# Patient Record
Sex: FEMALE | Race: BLACK OR AFRICAN AMERICAN | NOT HISPANIC OR LATINO | URBAN - METROPOLITAN AREA
[De-identification: names, ages, dates, MRNs, and addresses within clinical notes are randomized per-mention and may not be internally consistent; named-entity substitution may affect disease eponyms.]

---

## 2019-06-24 ENCOUNTER — EMERGENCY (EMERGENCY)
Facility: HOSPITAL | Age: 49
LOS: 1 days | Discharge: HOME | End: 2019-06-24
Attending: EMERGENCY MEDICINE | Admitting: EMERGENCY MEDICINE
Payer: COMMERCIAL

## 2019-06-24 VITALS
SYSTOLIC BLOOD PRESSURE: 147 MMHG | OXYGEN SATURATION: 99 % | TEMPERATURE: 98 F | WEIGHT: 154.98 LBS | RESPIRATION RATE: 16 BRPM | HEIGHT: 63 IN | HEART RATE: 74 BPM | DIASTOLIC BLOOD PRESSURE: 88 MMHG

## 2019-06-24 VITALS
OXYGEN SATURATION: 98 % | SYSTOLIC BLOOD PRESSURE: 126 MMHG | DIASTOLIC BLOOD PRESSURE: 74 MMHG | TEMPERATURE: 97 F | RESPIRATION RATE: 18 BRPM | HEART RATE: 72 BPM

## 2019-06-24 DIAGNOSIS — S31.801A LACERATION WITHOUT FOREIGN BODY OF UNSPECIFIED BUTTOCK, INITIAL ENCOUNTER: ICD-10-CM
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
## 2019-06-24 DIAGNOSIS — S09.90XA UNSPECIFIED INJURY OF HEAD, INITIAL ENCOUNTER: ICD-10-CM

## 2019-06-24 DIAGNOSIS — R42 DIZZINESS AND GIDDINESS: ICD-10-CM
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
## 2019-06-24 DIAGNOSIS — Z23 ENCOUNTER FOR IMMUNIZATION: ICD-10-CM

## 2019-06-24 PROCEDURE — 90715 TDAP VACCINE 7 YRS/> IM: CPT

## 2019-06-24 PROCEDURE — 99283 EMERGENCY DEPT VISIT LOW MDM: CPT | Mod: 25

## 2019-06-24 PROCEDURE — 90471 IMMUNIZATION ADMIN: CPT

## 2019-06-24 PROCEDURE — 99283 EMERGENCY DEPT VISIT LOW MDM: CPT

## 2019-06-24 PROCEDURE — 73502 X-RAY EXAM HIP UNI 2-3 VIEWS: CPT

## 2019-06-24 PROCEDURE — 73502 X-RAY EXAM HIP UNI 2-3 VIEWS: CPT | Mod: 26,LT

## 2019-06-24 RX ORDER — ACETAMINOPHEN 500 MG
1000 TABLET ORAL ONCE
Refills: 0 | Status: COMPLETED | OUTPATIENT
Start: 2019-06-24 | End: 2019-06-24

## 2019-06-24 RX ORDER — TETANUS TOXOID, REDUCED DIPHTHERIA TOXOID AND ACELLULAR PERTUSSIS VACCINE, ADSORBED 5; 2.5; 8; 8; 2.5 [IU]/.5ML; [IU]/.5ML; UG/.5ML; UG/.5ML; UG/.5ML
0.5 SUSPENSION INTRAMUSCULAR ONCE
Refills: 0 | Status: COMPLETED | OUTPATIENT
Start: 2019-06-24 | End: 2019-06-24

## 2019-06-24 RX ADMIN — Medication 1000 MILLIGRAM(S): at 14:33

## 2019-06-24 RX ADMIN — Medication 1000 MILLIGRAM(S): at 16:04

## 2019-06-24 RX ADMIN — TETANUS TOXOID, REDUCED DIPHTHERIA TOXOID AND ACELLULAR PERTUSSIS VACCINE, ADSORBED 0.5 MILLILITER(S): 5; 2.5; 8; 8; 2.5 SUSPENSION INTRAMUSCULAR at 16:02

## 2019-06-24 NOTE — ED ADULT NURSE NOTE - CHPI ED NUR SYMPTOMS NEG
no confusion/no weakness/no loss of consciousness/no numbness/no bleeding/no vomiting/no deformity/no tingling/no fever

## 2019-06-24 NOTE — ED PROVIDER NOTE - CLINICAL SUMMARY MEDICAL DECISION MAKING FREE TEXT BOX
mechanical fall last night w/ minor head inj. No signs of head trauma. No risk factors for inc bleeding. No neuro complaints or deficits. Pt does not meet criteria for head imaging. No midline pain. Analgesia. XR to r/o FB (glass). Wound care. Anticipate d/c

## 2019-06-24 NOTE — ED PROVIDER NOTE - NSFOLLOWUPINSTRUCTIONS_ED_ALL_ED_FT
You were evaluated in the ED after a fall last night with minor head injury. There was no visible trauma to your head. You may take Tylenol for pain. Your buttock wounds were cleansed and closed with surgical tape (steri-strips). Do not get this wet for 24 hours. The tape will fall off on its own. You received a tetanus vaccination today. It is good for the next 10 years.     Closed Head Injury    A closed head injury is an injury to your head that may or may not involve a traumatic brain injury (TBI). Symptoms of TBI can be short or long lasting and include headache, dizziness, interference with memory or speech, fatigue, confusion, changes in sleep, mood changes, nausea, depression/anxiety, and dulling of senses. Make sure to obtain proper rest which includes getting plenty of sleep, avoiding excessive visual stimulation, and avoiding activities that may cause physical or mental stress. Avoid any situation where there is potential for another head injury, including sports.    SEEK IMMEDIATE MEDICAL CARE IF YOU HAVE ANY OF THE FOLLOWING SYMPTOMS: unusual drowsiness, vomiting, severe dizziness, seizures, lightheadedness, muscular weakness, different pupil sizes, visual changes, or clear or bloody discharge from your ears or nose.     Post-Concussion Syndrome  Image   A concussion is a brain injury from a direct hit (blow) to your head or body. This blow causes your brain to shake quickly back and forth inside your skull. This can damage brain cells and cause chemical changes in your brain. Concussions are usually not life-threatening but can cause several serious symptoms.    Post-concussion syndrome is when symptoms that occur after a concussion last longer than normal. These symptoms can last from weeks to months.    What are the causes?  The cause of this condition is not known. It can happen whether your head injury was mild or severe.    What increases the risk?  You are more likely to develop this condition if:  You are female.  You are a child, teen, or young adult.  You had a past head injury.  You have a history of headaches.  You have depression or anxiety.  What are the signs or symptoms?  Physical symptoms     Headaches.  Tiredness.  Dizziness.  Weakness.  Blurry vision.  Sensitivity to light.  Hearing difficulties.  Mental and emotional symptoms     Memory difficulties.  Difficulty with concentration.  Difficulty sleeping or staying asleep.  Feeling irritable.  Anxiety or depression.  Difficulty learning new things.  How is this diagnosed?  This condition may be diagnosed based on:  Your symptoms.  A description of your injury.  Your medical history.  Your health care provider may order other tests such as:  Brain function tests (neurological testing).  CT scan.  How is this treated?  Treatment for this condition may depend on your symptoms. Symptoms usually go away on their own over time. Treatments may include:  Medicines for headaches.  Resting your brain and body for a few days after your injury.  Rehabilitation therapy, such as:  Physical or occupational therapy. This may include exercises to help with balance and dizziness.  Mental health counseling.  Speech therapy.  Vision therapy. A brain and eye specialist can recommend treatments for vision problems.  Follow these instructions at home:  Medicines     Take over-the-counter and prescription medicines only as told by your health care provider.  Avoid opioid prescription pain medicines when recovering from a concussion.  Activity     Limit your mental activities for the first few days after your injury, such as:  Homework or job-related work.  Complex thinking.  Watching TV, and using a computer or phone.  Playing memory games and puzzles.  Gradually return to your normal activity level. If a certain activity brings on your symptoms, stop or slow down until you can do the activity without it triggering your symptoms.  Limit physical activity, such as exercise or sports, for the first few days after a concussion. Gradually return to normal activity as told by your health care provider.  If a certain activity brings on your symptoms, stop or slow down until you can do the activity without it triggering your symptoms.  Rest. Rest helps your brain heal. Make sure you:  Get plenty of sleep at night. Most adults should get at least 7–9 hours of sleep each night.  Rest during the day. Take naps or rest breaks when you feel tired.  Do not do high-risk activities that could cause a second concussion, such as riding a bike or playing sports. Having another concussion before the first one has healed can be dangerous.  General instructions     Do not drink alcohol until your health care provider says you can.  Keep track of the frequency and the severity of your symptoms. Give this information to your health care provider.  Keep all follow-up visits as directed by your health care provider. This is important.  Contact a health care provider if:  Your symptoms do not improve.  You have another injury.  Get help right away if you:  Have a severe or worsening headache.  Are confused.  Have trouble staying awake.  Pass out.  Vomit.  Have weakness or numbness in any part of your body.  Have a seizure.  Have trouble speaking.  Summary  Post-concussion syndrome is when symptoms that occur after a concussion last longer than normal.  Symptoms usually go away on their own over time. Depending on your symptoms, you may need treatment, such as medicines or rehabilitation therapy.  Rest your brain and body for a few days after your injury. Gradually return to activities, as told by your health care provider.  Get plenty of sleep, and avoid alcohol and opioid pain medicines while recovering from a concussion.  This information is not intended to replace advice given to you by your health care provider. Make sure you discuss any questions you have with your health care provider.    Steristrips    WHAT YOU NEED TO KNOW:    Steristrips are sterile pieces of medical tape used to close wounds and help the edges grow back together. Steristrips keep the wound clean and protected while it heals. Steristrips usually fall off on their own in about 7 to 10 days.    DISCHARGE INSTRUCTIONS:    Return to the emergency department if:     You have a fever.       You see red streaks on your skin starting at the wound.       Your wound has a foul smell or is draining fluid or pus.       Your wound is red, swollen, and warm to the touch.       Your wound opens or comes apart.     Contact your healthcare provider if:     The skin under and around the steristrips itches or is not comfortable.      You have questions or concerns about your condition or care.     How to use steristrips: Always wash your hands before you care for your wound. This will help prevent infection. Clean and dry the skin around your wound before you put on new steristrips.    Care for the wound as directed. Do not bathe for 24 hours. After 24 hours, wash around the area gently as directed. Pat the area dry with a clean towel, or let it air dry. Do not rub the area with a towel to dry it. Check the wound for signs of infection, such as swelling or pus.       Only remove the steristrips if directed. Your healthcare provider may want you to remove the steristrips after a certain number of days. Do not remove them early, even if they are causing itching or discomfort. If you are directed to remove the steristrips, pull gently. You might cause the wound to open if you pull hard. Hold the skin down as you slowly and gently remove the strips.      Apply new steristrips as directed. Start at the middle of the wound. Gently bring the edges of the wound together and place the middle of the steristrip on the wound. Do not stretch the steristrip. Smooth the ends of the steristrip down onto your skin. Add more steristrips as needed for the rest of the wound. Leave small gaps between strips so you do not completely cover the wound. Fluid from the wound may build under the strips if you completely cover it. The fluid may make the strips peel. Your healthcare provider may recommend that you add steristrips down the ends of the pieces you placed across the wound. This will help keep the steristrips in place as you move.      Do not scrub or pick at the steristrips. This can cause your wound to open. Trim the edges of the strips if they start to curl. Then press the ends firmly onto your skin.    Follow up with your healthcare provider as directed: Write down your questions so you remember to ask them during your visits.

## 2019-06-24 NOTE — ED PROVIDER NOTE - OBJECTIVE STATEMENT
Pt w/ no sig PMHx presents s/p fall last night. Pt slipped on oil on the floor and fell backwards onto broken glass on the ground w/ her buttock, and hitting her head. No LOC. Pt felt initially dizzy. Pt reports cuts to her buttock. Pt c/o pain at site of hitting her head in the occipital region, pain in b/l trapezius mm and shoulders. No midline pain. no numbness/ tingling. No focal weakness. No n/v, confusion. No hx alcohol intake or drug abuse. No AC use. Pt has not taken anything for pain

## 2019-06-24 NOTE — ED ADULT NURSE NOTE - OTHER COMPLAINTS
Pt states she also has cuts to her buttock because the oil came from a glass olive oil bottle that fell". pt denies LOC and blood thinners.

## 2019-06-24 NOTE — ED PROVIDER NOTE - PHYSICAL EXAMINATION
Constitutional: Well appearing, well nourished, awake, alert, oriented to person, place, time/situation and in no apparent distress.  head atraumatic, normocephalic. no abrasions, lacerations, or hematomas  ENMT: Airway patent. Normal MM. no romero signs or raccoon's eyes. no hemotympanum  Eyes: Clear bilaterally. PERRL. EOMI. No nystagmus  Cardiac: Normal rate, regular rhythm.  Heart sounds S1, S2.  No murmurs, rubs or gallops.  Respiratory: Breaths sounds equal and clear b/l. No increased WOB, tachypnea, hypoxia, or accessory mm use. Pt speaks in full sentences.   Gastrointestinal: Abd soft, NT, ND, NABS. No guarding, rebound, or rigidity. No pulsatile abdominal masses. No organomegaly appreciated. No CVAT   Musculoskeletal: Range of motion is not limited. No midline ttp. + paraspinal L cervical and b/l thoracic mm ttp  Neuro: Alert & Oriented x 3. CN II-XII intact. No facial droop. Clear speech. BISHOP w/ 5/5 strength x 4 ext. Normal sensation. No pronator drift. No dysdidokinesia nor dysmetria. Normal heel-to-shin.   Skin: Skin warm, dry. No rash. 4-5 superficial lacerations to buttock. No obvious foreign body  Psych: Alert and oriented to person, place, time/situation. normal mood and affect. no apparent risk to self or others. Constitutional: Well appearing, well nourished, awake, alert, oriented to person, place, time/situation and in no apparent distress.  head atraumatic, normocephalic. no abrasions, lacerations, or hematomas  ENMT: Airway patent. Normal MM. no romero signs or raccoon's eyes. no hemotympanum  Eyes: Clear bilaterally. PERRL. EOMI. No nystagmus  Cardiac: Normal rate, regular rhythm.  Heart sounds S1, S2.  No murmurs, rubs or gallops.  Respiratory: Breaths sounds equal and clear b/l. No increased WOB, tachypnea, hypoxia, or accessory mm use. Pt speaks in full sentences.   Gastrointestinal: Abd soft, NT, ND, NABS. No guarding, rebound, or rigidity. No pulsatile abdominal masses. No organomegaly appreciated. No CVAT   Musculoskeletal: Range of motion is not limited. No midline ttp. + paraspinal L cervical and b/l thoracic mm ttp  Neuro: Alert & Oriented x 3. CN II-XII intact. No facial droop. Clear speech. BISHOP w/ 5/5 strength x 4 ext. Normal sensation. No pronator drift. No dysdidokinesia nor dysmetria. Normal heel-to-shin.   Skin: Skin warm, dry. No rash. 7 superficial lacerations involving only superficial dermis layer, each < 1 cm to buttock. No foreign body on probing  Psych: Alert and oriented to person, place, time/situation. normal mood and affect. no apparent risk to self or others.

## 2019-06-24 NOTE — ED ADULT TRIAGE NOTE - OTHER COMPLAINTS
Pt states she also has cuts to her buttock because the oil came from a glass olive oil bottle that fell". Pt states she also has cuts to her buttock because the oil came from a glass olive oil bottle that fell". pt denies LOC and blood thinners.

## 2019-06-24 NOTE — ED ADULT NURSE NOTE - OBJECTIVE STATEMENT
Patient states was at home when at 11 pm last night  slipped on oil on floor, hit back of head, back and buttocks, states there was broken glass on floor and also hurt left hand, abrasions on hand and buttocks. States no LOC , felt dizzy and went to [facility] but left before being seen due to long wait time.  States has nausea, no vomitting, w/ mild dizziness, c/o of pain on back of head, lower back and shoulders.

## 2019-06-24 NOTE — ED ADULT NURSE REASSESSMENT NOTE - NS ED NURSE REASSESS COMMENT FT1
Patient headache and body aches improved s/p Acetaminophen PO, wounds cleansed and dressing applied.  All tests resulted, discharged to home in stable condition, VSS.

## 2019-06-24 NOTE — ED ADULT NURSE NOTE - NSIMPLEMENTINTERV_GEN_ALL_ED
Implemented All Universal Safety Interventions:  [place] to call system. Call bell, personal items and telephone within reach. Instruct patient to call for assistance. Room bathroom lighting operational. Non-slip footwear when patient is off stretcher. Physically safe environment: no spills, clutter or unnecessary equipment. Stretcher in lowest position, wheels locked, appropriate side rails in place.

## 2019-06-24 NOTE — ED PROVIDER NOTE - NS ED ROS FT
Constitutional: No fever or chills.   Eyes: No pain, blurry vision, or discharge.  ENMT: No hearing changes, pain, discharge or infections. No neck pain or stiffness.  Cardiac: No chest pain, SOB or edema. No chest pain with exertion.  Respiratory: No cough or respiratory distress. No hemoptysis. No history of asthma or RAD.  GI: No nausea, vomiting, diarrhea or abdominal pain.  : No dysuria, frequency or burning.  MS: No myalgia, muscle weakness, joint pain or back pain.  Neuro: No headache or weakness. No LOC.  Skin: See HPI  Endocrine: No history of thyroid disease or diabetes.  Except as documented in the HPI, all other systems are negative.

## 2019-06-24 NOTE — ED PROVIDER NOTE - DIAGNOSTIC INTERPRETATION
INTERPRETATION:  no acute fracture; no soft tissue swelling noted; normal bony alignment. No FB seen.

## 2019-09-01 ENCOUNTER — EMERGENCY (EMERGENCY)
Facility: HOSPITAL | Age: 49
LOS: 1 days | Discharge: HOME | End: 2019-09-01
Admitting: EMERGENCY MEDICINE
Payer: COMMERCIAL

## 2019-09-01 VITALS
DIASTOLIC BLOOD PRESSURE: 63 MMHG | SYSTOLIC BLOOD PRESSURE: 120 MMHG | RESPIRATION RATE: 18 BRPM | TEMPERATURE: 98 F | OXYGEN SATURATION: 99 % | HEART RATE: 79 BPM | WEIGHT: 164.46 LBS

## 2019-09-01 PROCEDURE — 99283 EMERGENCY DEPT VISIT LOW MDM: CPT

## 2019-09-01 PROCEDURE — 73562 X-RAY EXAM OF KNEE 3: CPT | Mod: 26,LT

## 2019-09-01 PROCEDURE — 73562 X-RAY EXAM OF KNEE 3: CPT

## 2019-09-01 RX ADMIN — Medication 500 MILLIGRAM(S): at 18:16

## 2019-09-01 NOTE — ED PROVIDER NOTE - PATIENT PORTAL LINK FT
You can access the FollowMyHealth Patient Portal offered by [facility] by registering at the following website: [email or web address]. By joining [employer]’s FollowMyHealth portal, you will also be able to view your health information using other applications (apps) compatible with our system.

## 2019-09-01 NOTE — ED PROVIDER NOTE - CARE PROVIDER_API CALL
Kathia Montes De Oca)  Orthopaedic Surgery Surgery  [address] [place] [id number]  Phone: [phone]  Fax: [phone]  Follow Up Time:     Karan Jung)  Orthopaedic Surgery  [address] [address]  Phone: [phone]  Fax: [phone]  Follow Up Time:

## 2019-09-01 NOTE — ED PROVIDER NOTE - CLINICAL SUMMARY MEDICAL DECISION MAKING FREE TEXT BOX
47 y/o f presents c/o left knee pain x 1 week; ext NVI, xray negative, ace wrap given, will rx naproxen, recommend RICE, f/u ortho

## 2019-09-01 NOTE — ED PROVIDER NOTE - OBJECTIVE STATEMENT
49 y/o f presents c/o 1 week of left knee pain.  Pt stating "I feel a click", she also reports hitting her knee over the past week although pain had already been going on.  Pt stating she took naproxen yesterday with improvement.  Pt was seen at another ED in the past 3 months for same complaint, had negative ultrasound per pt.  Denies numbness/tingling to ext, weakness, all other ROS negative.

## 2019-09-01 NOTE — ED ADULT NURSE NOTE - NSIMPLEMENTINTERV_GEN_ALL_ED
Implemented All Universal Safety Interventions:  [place] to call system. Call bell, personal items and telephone within reach. Instruct patient to call for assistance. Room bathroom lighting operational. Non-slip footwear when patient is off stretcher. Physically safe environment: no spills, clutter or unnecessary equipment. Stretcher in lowest position, wheels locked, appropriate side rails in place.

## 2019-09-01 NOTE — ED PROVIDER NOTE - MUSCULOSKELETAL, MLM
+generalized tenderness to left knee, no swelling, no deformity, +FROM, strength 5/5, sensation intact distally

## 2019-09-05 DIAGNOSIS — M25.562 PAIN IN LEFT KNEE: ICD-10-CM

## 2019-09-16 ENCOUNTER — FORM ENCOUNTER (OUTPATIENT)
Age: 49
End: 2019-09-16

## 2019-09-17 ENCOUNTER — APPOINTMENT (OUTPATIENT)
Dept: ORTHOPEDIC SURGERY | Facility: CLINIC | Age: 49
End: 2019-09-17
Payer: COMMERCIAL

## 2019-09-17 ENCOUNTER — OUTPATIENT (OUTPATIENT)
Dept: OUTPATIENT SERVICES | Facility: HOSPITAL | Age: 49
LOS: 1 days | End: 2019-09-17
Payer: COMMERCIAL

## 2019-09-17 VITALS — BODY MASS INDEX: 29.06 KG/M2 | WEIGHT: 164 LBS | HEIGHT: 63 IN

## 2019-09-17 PROCEDURE — 72170 X-RAY EXAM OF PELVIS: CPT | Mod: 26

## 2019-09-17 PROCEDURE — 73564 X-RAY EXAM KNEE 4 OR MORE: CPT | Mod: 26,50

## 2019-09-17 PROCEDURE — 99203 OFFICE O/P NEW LOW 30 MIN: CPT

## 2019-09-17 PROCEDURE — 73564 X-RAY EXAM KNEE 4 OR MORE: CPT

## 2019-09-17 PROCEDURE — 72170 X-RAY EXAM OF PELVIS: CPT

## 2019-09-17 NOTE — PHYSICAL EXAM
[de-identified] : Constitutional: Well appearing.  No acute distress.\par Mental Status: Alert & oriented to person, place and time. Normal affect.\par Pulmonary: No respiratory distress. Normal chest excursion.\par Abdomen: Soft and not distended.\par \par Gait: Severe Antalgic.\par Ambulatory assist devices: Cane.\par Cervical spine: Skin intact. No visible deformity.  Painless active ROM without evident restriction.\par Bilateral upper extremities: Skin intact. No deformity. Painless active ROM without evident restriction.\par Thoracolumbar spine: No deformity. No tenderness. No radicular pain on passive straight leg raise bilaterally.\par Pelvis: No pelvic obliquity. No tenderness.\par Leg lengths: Equal.\par \par Bilateral Hips: No swelling or deformity. No focal tenderness. Painless and unrestricted range of motion.  No crepitation. Hip flexor and abductor power 5/5.\par \par Right Knee:\par Skin intact.\par No surgical scars.\par No erythema or ecchymosis.\par No swelling or effusion.\par No deformity.\par Minimal medial joint line tenderness.\par Mildly painful ROM from full extension to 120 degrees of flexion.\par Central patellar tracking.\par No crepitation.\par No instability.\par Quadriceps power 5/5.\par \par Left Knee:\par Skin intact.\par No surgical scars.\par No erythema or ecchymosis.\par Trace effusion.\par No deformity.\par Medial joint line tenderness.\par Painful ROM from full extension to 100 degrees of flexion, limited due to patient guarding.\par Central patellar tracking.\par No crepitation.\par No instability.\par Quadriceps power 5/5.\par \par Neurological: Intact distal crude touch sensation. Normal distal motor power. Symmetric knee jerk and ankle jerk reflexes bilaterally.\par Cardiovascular: Palpable dorsalis pedis and posterior tibialis pulses. Brisk capillary refill. No peripheral edema.\par Lymphatics:  No peripheral adenopathy appreciated.\par \par  [de-identified] : X-rays taken today demonstrate left knee with minimal degenerative changes with out acute fracture or dislocation, right knee with mild medial joint space narrowing

## 2019-09-17 NOTE — REASON FOR VISIT
[Initial Visit] : an initial visit for [Knee Pain] : knee pain [FreeTextEntry2] : bilateral knee left > right

## 2019-09-17 NOTE — HISTORY OF PRESENT ILLNESS
[de-identified] : Patient presents for evaluation of left knee pain which began about one and a half months ago. She notes pain began after fall down two steps and jumping onto knee. She has severe pain when weightbearing and notes buckling and instability. She has tried PT without relief. She is ambulating with a cane. She is taking Naproxen without relief.

## 2019-09-17 NOTE — DISCUSSION/SUMMARY
[de-identified] : Diagnosis, prognosis and treatment options discussed. Conservative management including activity modification, therapeutic exercise with PT, topical and oral antiinflammatories, steroid and HA injections discussed. Given patient's severe pain will order MRI. Patient advised to follow up after imaging.

## 2019-10-01 ENCOUNTER — FORM ENCOUNTER (OUTPATIENT)
Age: 49
End: 2019-10-01

## 2019-10-02 ENCOUNTER — APPOINTMENT (OUTPATIENT)
Dept: MRI IMAGING | Facility: HOSPITAL | Age: 49
End: 2019-10-02
Payer: COMMERCIAL

## 2019-10-02 ENCOUNTER — OUTPATIENT (OUTPATIENT)
Dept: OUTPATIENT SERVICES | Facility: HOSPITAL | Age: 49
LOS: 1 days | End: 2019-10-02
Payer: COMMERCIAL

## 2019-10-02 PROCEDURE — 73721 MRI JNT OF LWR EXTRE W/O DYE: CPT | Mod: 26,LT

## 2019-10-02 PROCEDURE — 73721 MRI JNT OF LWR EXTRE W/O DYE: CPT

## 2019-10-08 ENCOUNTER — APPOINTMENT (OUTPATIENT)
Dept: ORTHOPEDIC SURGERY | Facility: CLINIC | Age: 49
End: 2019-10-08
Payer: COMMERCIAL

## 2019-10-08 VITALS — BODY MASS INDEX: 29.06 KG/M2 | HEIGHT: 63 IN | WEIGHT: 164 LBS

## 2019-10-08 PROCEDURE — 99213 OFFICE O/P EST LOW 20 MIN: CPT

## 2019-10-08 NOTE — DISCUSSION/SUMMARY
[de-identified] : Diagnosis, prognosis and treatment options discussed. Conservative management including activity modification, therapeutic exercise with PT, topical and oral antiinflammatories, steroid and HA injections discussed. Patient will continue PT and NSAID prn. Given names of arthroscopy specialist if pain persists.

## 2019-10-08 NOTE — ADDENDUM
[FreeTextEntry1] : Dr. Rosado was physically present during the key portions the encounter, provided assistance as needed, and formulated the assessment and plan as documented above.\par \par

## 2019-10-08 NOTE — HISTORY OF PRESENT ILLNESS
[de-identified] : Patient presents for follow up of left knee pain and MRI review. She reports no improvement in symptoms. She has been doing PT and taking Naproxen without relief.

## 2019-10-08 NOTE — PHYSICAL EXAM
[de-identified] : Constitutional: Well appearing. No acute distress.\par Gait: Severe Antalgic.\par Ambulatory assist devices: Cane.\par \par Left Knee:\par Skin intact.\par No surgical scars.\par No erythema or ecchymosis.\par Trace effusion.\par No deformity.\par Medial joint line tenderness.\par Painful ROM from full extension to 100 degrees of flexion, limited due to patient guarding.\par Central patellar tracking.\par No crepitation.\par No instability.\par Quadriceps power 5/5.\par \par Neurological: Intact distal crude touch sensation. Normal distal motor power. Symmetric knee jerk and ankle jerk reflexes bilaterally.\par Cardiovascular: Palpable dorsalis pedis and posterior tibialis pulses. Brisk capillary refill. No peripheral edema.\par Lymphatics: No peripheral adenopathy appreciated.\par  [de-identified] : MRI reviewed, detailed report scanned into chart

## 2019-10-23 ENCOUNTER — APPOINTMENT (OUTPATIENT)
Dept: ORTHOPEDIC SURGERY | Facility: CLINIC | Age: 49
End: 2019-10-23
Payer: COMMERCIAL

## 2019-10-23 VITALS — DIASTOLIC BLOOD PRESSURE: 81 MMHG | OXYGEN SATURATION: 99 % | HEART RATE: 70 BPM | SYSTOLIC BLOOD PRESSURE: 123 MMHG

## 2019-10-23 VITALS — RESPIRATION RATE: 16 BRPM | WEIGHT: 164 LBS | BODY MASS INDEX: 29.06 KG/M2 | HEIGHT: 63 IN

## 2019-10-23 DIAGNOSIS — Z78.9 OTHER SPECIFIED HEALTH STATUS: ICD-10-CM

## 2019-10-23 PROCEDURE — 99214 OFFICE O/P EST MOD 30 MIN: CPT

## 2019-11-21 ENCOUNTER — RX CHANGE (OUTPATIENT)
Age: 49
End: 2019-11-21

## 2019-11-21 ENCOUNTER — APPOINTMENT (OUTPATIENT)
Age: 49
End: 2019-11-21
Payer: COMMERCIAL

## 2019-11-21 PROCEDURE — 29882 ARTHRS KNE SRG MNISC RPR M/L: CPT | Mod: LT

## 2019-11-21 PROCEDURE — 29877 ARTHRS KNEE SURG DBRDMT/SHVG: CPT | Mod: NC,LT

## 2019-11-21 RX ORDER — ASPIRIN 325 MG/1
325 TABLET, FILM COATED ORAL DAILY
Qty: 15 | Refills: 0 | Status: ACTIVE | COMMUNITY
Start: 2019-11-21 | End: 1900-01-01

## 2019-11-21 RX ORDER — DOCUSATE SODIUM 100 MG
100 TABLET ORAL
Qty: 30 | Refills: 0 | Status: ACTIVE | COMMUNITY
Start: 2019-11-21 | End: 1900-01-01

## 2019-12-04 ENCOUNTER — APPOINTMENT (OUTPATIENT)
Dept: ORTHOPEDIC SURGERY | Facility: CLINIC | Age: 49
End: 2019-12-04
Payer: COMMERCIAL

## 2019-12-04 VITALS — BODY MASS INDEX: 29.06 KG/M2 | RESPIRATION RATE: 16 BRPM | HEIGHT: 63 IN | WEIGHT: 164 LBS

## 2019-12-04 DIAGNOSIS — S83.272A COMPLEX TEAR OF LATERAL MENISCUS, CURRENT INJURY, LEFT KNEE, INITIAL ENCOUNTER: ICD-10-CM

## 2019-12-04 DIAGNOSIS — S83.249A OTHER TEAR OF MEDIAL MENISCUS, CURRENT INJURY, UNSPECIFIED KNEE, INITIAL ENCOUNTER: ICD-10-CM

## 2019-12-04 DIAGNOSIS — M25.562 PAIN IN LEFT KNEE: ICD-10-CM

## 2019-12-04 PROCEDURE — 99024 POSTOP FOLLOW-UP VISIT: CPT

## 2020-01-06 ENCOUNTER — APPOINTMENT (OUTPATIENT)
Dept: ORTHOPEDIC SURGERY | Facility: CLINIC | Age: 50
End: 2020-01-06
Payer: COMMERCIAL

## 2020-01-06 PROCEDURE — 99024 POSTOP FOLLOW-UP VISIT: CPT

## 2020-02-19 ENCOUNTER — APPOINTMENT (OUTPATIENT)
Dept: ORTHOPEDIC SURGERY | Facility: CLINIC | Age: 50
End: 2020-02-19
Payer: COMMERCIAL

## 2020-02-19 VITALS — RESPIRATION RATE: 16 BRPM | BODY MASS INDEX: 29.06 KG/M2 | WEIGHT: 164 LBS | HEIGHT: 63 IN

## 2020-02-19 DIAGNOSIS — S83.242A OTHER TEAR OF MEDIAL MENISCUS, CURRENT INJURY, LEFT KNEE, INITIAL ENCOUNTER: ICD-10-CM

## 2020-02-19 PROCEDURE — 99024 POSTOP FOLLOW-UP VISIT: CPT

## 2020-02-24 ENCOUNTER — EMERGENCY (EMERGENCY)
Facility: HOSPITAL | Age: 50
LOS: 1 days | Discharge: HOME | End: 2020-02-24
Attending: EMERGENCY MEDICINE | Admitting: EMERGENCY MEDICINE
Payer: COMMERCIAL

## 2020-02-24 VITALS
HEART RATE: 91 BPM | HEIGHT: 63 IN | TEMPERATURE: 99 F | DIASTOLIC BLOOD PRESSURE: 83 MMHG | WEIGHT: 179.02 LBS | OXYGEN SATURATION: 100 % | SYSTOLIC BLOOD PRESSURE: 148 MMHG | RESPIRATION RATE: 18 BRPM

## 2020-02-24 VITALS
HEART RATE: 88 BPM | TEMPERATURE: 99 F | SYSTOLIC BLOOD PRESSURE: 139 MMHG | RESPIRATION RATE: 18 BRPM | OXYGEN SATURATION: 99 % | DIASTOLIC BLOOD PRESSURE: 82 MMHG

## 2020-02-24 LAB
ALBUMIN SERPL ELPH-MCNC: 3.8 G/DL — SIGNIFICANT CHANGE UP (ref 3.3–5)
ALP SERPL-CCNC: SIGNIFICANT CHANGE UP (ref 40–120)
ALT FLD-CCNC: SIGNIFICANT CHANGE UP (ref 10–45)
ANION GAP SERPL CALC-SCNC: 10 MMOL/L — SIGNIFICANT CHANGE UP (ref 5–17)
AST SERPL-CCNC: SIGNIFICANT CHANGE UP (ref 10–40)
BASOPHILS # BLD AUTO: 0.03 K/UL — SIGNIFICANT CHANGE UP (ref 0–0.2)
BASOPHILS NFR BLD AUTO: 0.5 % — SIGNIFICANT CHANGE UP (ref 0–2)
BILIRUB SERPL-MCNC: 0.4 MG/DL — SIGNIFICANT CHANGE UP (ref 0.2–1.2)
BUN SERPL-MCNC: 8 MG/DL — SIGNIFICANT CHANGE UP (ref 7–23)
CALCIUM SERPL-MCNC: 8.9 MG/DL — SIGNIFICANT CHANGE UP (ref 8.4–10.5)
CHLORIDE SERPL-SCNC: 102 MMOL/L — SIGNIFICANT CHANGE UP (ref 96–108)
CO2 SERPL-SCNC: 24 MMOL/L — SIGNIFICANT CHANGE UP (ref 22–31)
CREAT SERPL-MCNC: 0.7 MG/DL — SIGNIFICANT CHANGE UP (ref 0.5–1.3)
D DIMER BLD IA.RAPID-MCNC: 346 NG/ML DDU — HIGH
EOSINOPHIL # BLD AUTO: 0.11 K/UL — SIGNIFICANT CHANGE UP (ref 0–0.5)
EOSINOPHIL NFR BLD AUTO: 1.8 % — SIGNIFICANT CHANGE UP (ref 0–6)
GLUCOSE SERPL-MCNC: 93 MG/DL — SIGNIFICANT CHANGE UP (ref 70–99)
HCT VFR BLD CALC: 37 % — SIGNIFICANT CHANGE UP (ref 34.5–45)
HGB BLD-MCNC: 11.7 G/DL — SIGNIFICANT CHANGE UP (ref 11.5–15.5)
IMM GRANULOCYTES NFR BLD AUTO: 0.2 % — SIGNIFICANT CHANGE UP (ref 0–1.5)
LYMPHOCYTES # BLD AUTO: 1.78 K/UL — SIGNIFICANT CHANGE UP (ref 1–3.3)
LYMPHOCYTES # BLD AUTO: 28.6 % — SIGNIFICANT CHANGE UP (ref 13–44)
MCHC RBC-ENTMCNC: 29.3 PG — SIGNIFICANT CHANGE UP (ref 27–34)
MCHC RBC-ENTMCNC: 31.6 GM/DL — LOW (ref 32–36)
MCV RBC AUTO: 92.5 FL — SIGNIFICANT CHANGE UP (ref 80–100)
MONOCYTES # BLD AUTO: 0.51 K/UL — SIGNIFICANT CHANGE UP (ref 0–0.9)
MONOCYTES NFR BLD AUTO: 8.2 % — SIGNIFICANT CHANGE UP (ref 2–14)
NEUTROPHILS # BLD AUTO: 3.79 K/UL — SIGNIFICANT CHANGE UP (ref 1.8–7.4)
NEUTROPHILS NFR BLD AUTO: 60.7 % — SIGNIFICANT CHANGE UP (ref 43–77)
NRBC # BLD: 0 /100 WBCS — SIGNIFICANT CHANGE UP (ref 0–0)
PLATELET # BLD AUTO: 319 K/UL — SIGNIFICANT CHANGE UP (ref 150–400)
POTASSIUM SERPL-MCNC: SIGNIFICANT CHANGE UP (ref 3.5–5.3)
POTASSIUM SERPL-SCNC: SIGNIFICANT CHANGE UP (ref 3.5–5.3)
PROT SERPL-MCNC: 7.4 G/DL — SIGNIFICANT CHANGE UP (ref 6–8.3)
RBC # BLD: 4 M/UL — SIGNIFICANT CHANGE UP (ref 3.8–5.2)
RBC # FLD: 13.3 % — SIGNIFICANT CHANGE UP (ref 10.3–14.5)
SODIUM SERPL-SCNC: 136 MMOL/L — SIGNIFICANT CHANGE UP (ref 135–145)
WBC # BLD: 6.23 K/UL — SIGNIFICANT CHANGE UP (ref 3.8–10.5)
WBC # FLD AUTO: 6.23 K/UL — SIGNIFICANT CHANGE UP (ref 3.8–10.5)

## 2020-02-24 PROCEDURE — 80053 COMPREHEN METABOLIC PANEL: CPT

## 2020-02-24 PROCEDURE — 71275 CT ANGIOGRAPHY CHEST: CPT | Mod: 26

## 2020-02-24 PROCEDURE — 71275 CT ANGIOGRAPHY CHEST: CPT

## 2020-02-24 PROCEDURE — 71046 X-RAY EXAM CHEST 2 VIEWS: CPT | Mod: 26

## 2020-02-24 PROCEDURE — 85025 COMPLETE CBC W/AUTO DIFF WBC: CPT

## 2020-02-24 PROCEDURE — 99284 EMERGENCY DEPT VISIT MOD MDM: CPT | Mod: 25

## 2020-02-24 PROCEDURE — 85379 FIBRIN DEGRADATION QUANT: CPT

## 2020-02-24 PROCEDURE — 99285 EMERGENCY DEPT VISIT HI MDM: CPT | Mod: 25

## 2020-02-24 PROCEDURE — 36415 COLL VENOUS BLD VENIPUNCTURE: CPT

## 2020-02-24 PROCEDURE — 71046 X-RAY EXAM CHEST 2 VIEWS: CPT

## 2020-02-24 RX ORDER — FLUCONAZOLE 150 MG/1
150 TABLET ORAL ONCE
Refills: 0 | Status: COMPLETED | OUTPATIENT
Start: 2020-02-24 | End: 2020-02-24

## 2020-02-24 RX ADMIN — FLUCONAZOLE 150 MILLIGRAM(S): 150 TABLET ORAL at 17:55

## 2020-02-24 NOTE — ED PROVIDER NOTE - CLINICAL SUMMARY MEDICAL DECISION MAKING FREE TEXT BOX
49 healthy F p/w 2 weeks intermittent chest discomfort; recent sx end of november, EKG nonischemic, CXR no infiltrate/effusion, ddimer 346 will obtain CTA chest.  also w/ 2 days vaginal discharge- candida on exam, given diflucan PO in ED, no STI risk

## 2020-02-24 NOTE — ED PROVIDER NOTE - OBJECTIVE STATEMENT
49 healthy F p/w 2 weeks intermittent chest discomfort and 2 days vaginal discharge.  Pt reports vague chest discomfort described as congestion- no associated sob, with episodes of sharp nonpleuritic pain.  Pt nonsmoker, no h/o asthma but states she feels like she wheezes when she experiences symptoms.  Had L knee arthroscopy end of November, denies leg pain/swelling.  Pt also c/o pruritic white vaginal d/c x 2 days, no urinary symptoms.  Pt has not been sexually active since  passed one year ago.  Denies fever, chills, headache, dizziness, cough, abd pain, nvd, travel, OCP use, trauma 49 healthy F p/w 2 weeks intermittent chest discomfort and 2 days vaginal discharge.  Pt reports vague chest discomfort described as congestion- no associated sob, with episodes of sharp nonpleuritic pain.  Pt nonsmoker, no h/o asthma but states she feels like she wheezes/that symptoms are related to her seasonal allergies.  Had L knee arthroscopy end of November, denies leg pain/swelling.  Pt also c/o pruritic white vaginal d/c x 2 days, no urinary symptoms.  Pt has not been sexually active since  passed one year ago.  Denies fever, chills, headache, dizziness, cough, abd pain, nvd, travel, OCP use, trauma

## 2020-02-24 NOTE — ED PROVIDER NOTE - NSFOLLOWUPINSTRUCTIONS_ED_ALL_ED_FT
Chest Pain    Chest pain can be caused by many different conditions which may or may not be dangerous. Causes include heartburn, lung infections, heart attack, blood clot in lungs, skin infections, strain or damage to muscle, cartilage, or bones, etc. In addition to a history and physical examination, an electrocardiogram (ECG) or other lab tests may have been performed to determine the cause of your chest pain. Follow up with your primary care provider or with a cardiologist as instructed.     SEEK IMMEDIATE MEDICAL CARE IF YOU HAVE ANY OF THE FOLLOWING SYMPTOMS: worsening chest pain, coughing up blood, unexplained back/neck/jaw pain, severe abdominal pain, dizziness or lightheadedness, fainting, shortness of breath, sweaty or clammy skin, vomiting, or racing heart beat. These symptoms may represent a serious problem that is an emergency. Do not wait to see if the symptoms will go away. Get medical help right away. Call 911 and do not drive yourself to the hospital.      Vaginal Yeast infection, Adult     Vaginal yeast infection is a condition that causes vaginal discharge as well as soreness, swelling, and redness (inflammation) of the vagina. This is a common condition. Some women get this infection frequently.  What are the causes?  This condition is caused by a change in the normal balance of the yeast (candida) and bacteria that live in the vagina. This change causes an overgrowth of yeast, which causes the inflammation.  What increases the risk?  The condition is more likely to develop in women who:  Take antibiotic medicines.Have diabetes.Take birth control pills.Are pregnant.Douche often.Have a weak body defense system (immune system).Have been taking steroid medicines for a long time.Frequently wear tight clothing.What are the signs or symptoms?  Symptoms of this condition include:  White, thick, creamy vaginal discharge.Swelling, itching, redness, and irritation of the vagina. The lips of the vagina (vulva) may be affected as well.Pain or a burning feeling while urinating.Pain during sex.How is this diagnosed?  This condition is diagnosed based on:  Your medical history.A physical exam.A pelvic exam. Your health care provider will examine a sample of your vaginal discharge under a microscope. Your health care provider may send this sample for testing to confirm the diagnosis.How is this treated?  This condition is treated with medicine. Medicines may be over-the-counter or prescription. You may be told to use one or more of the following:  Medicine that is taken by mouth (orally).Medicine that is applied as a cream (topically).Medicine that is inserted directly into the vagina (suppository).Follow these instructions at home:     Lifestyle     Do not have sex until your health care provider approves. Tell your sex partner that you have a yeast infection. That person should go to his or her health care provider and ask if they should also be treated.Do not wear tight clothes, such as pantyhose or tight pants.Wear breathable cotton underwear.General instructions     Take or apply over-the-counter and prescription medicines only as told by your health care provider.Eat more yogurt. This may help to keep your yeast infection from returning.Do not use tampons until your health care provider approves.Try taking a sitz bath to help with discomfort. This is a warm water bath that is taken while you are sitting down. The water should only come up to your hips and should cover your buttocks. Do this 3–4 times per day or as told by your health care provider.Do not douche.If you have diabetes, keep your blood sugar levels under control.Keep all follow-up visits as told by your health care provider. This is important.Contact a health care provider if:  You have a fever.Your symptoms go away and then return.Your symptoms do not get better with treatment.Your symptoms get worse.You have new symptoms.You develop blisters in or around your vagina.You have blood coming from your vagina and it is not your menstrual period.You develop pain in your abdomen.Summary  Vaginal yeast infection is a condition that causes discharge as well as soreness, swelling, and redness (inflammation) of the vagina.This condition is treated with medicine. Medicines may be over-the-counter or prescription.Take or apply over-the-counter and prescription medicines only as told by your health care provider.Do not douche. Do not have sex or use tampons until your health care provider approves.Contact a health care provider if your symptoms do not get better with treatment or your symptoms go away and then return.This information is not intended to replace advice given to you by your health care provider. Make sure you discuss any questions you have with your health care provider. Your preliminary CT results showed no evidence of blood clots in the lung. A final report will result within 24 hours. You may call [phone] for these results. Your blood work and EKG did not show any acute findings.     Chest Pain    Chest pain can be caused by many different conditions which may or may not be dangerous. Causes include heartburn, lung infections, heart attack, blood clot in lungs, skin infections, strain or damage to muscle, cartilage, or bones, etc. In addition to a history and physical examination, an electrocardiogram (ECG) or other lab tests may have been performed to determine the cause of your chest pain. Follow up with your primary care provider or with a cardiologist as instructed.     SEEK IMMEDIATE MEDICAL CARE IF YOU HAVE ANY OF THE FOLLOWING SYMPTOMS: worsening chest pain, coughing up blood, unexplained back/neck/jaw pain, severe abdominal pain, dizziness or lightheadedness, fainting, shortness of breath, sweaty or clammy skin, vomiting, or racing heart beat. These symptoms may represent a serious problem that is an emergency. Do not wait to see if the symptoms will go away. Get medical help right away. Call 911 and do not drive yourself to the hospital.      Vaginal Yeast infection, Adult     Vaginal yeast infection is a condition that causes vaginal discharge as well as soreness, swelling, and redness (inflammation) of the vagina. This is a common condition. Some women get this infection frequently.  What are the causes?  This condition is caused by a change in the normal balance of the yeast (candida) and bacteria that live in the vagina. This change causes an overgrowth of yeast, which causes the inflammation.  What increases the risk?  The condition is more likely to develop in women who:  Take antibiotic medicines.Have diabetes.Take birth control pills.Are pregnant.Douche often.Have a weak body defense system (immune system).Have been taking steroid medicines for a long time.Frequently wear tight clothing.What are the signs or symptoms?  Symptoms of this condition include:  White, thick, creamy vaginal discharge.Swelling, itching, redness, and irritation of the vagina. The lips of the vagina (vulva) may be affected as well.Pain or a burning feeling while urinating.Pain during sex.How is this diagnosed?  This condition is diagnosed based on:  Your medical history.A physical exam.A pelvic exam. Your health care provider will examine a sample of your vaginal discharge under a microscope. Your health care provider may send this sample for testing to confirm the diagnosis.How is this treated?  This condition is treated with medicine. Medicines may be over-the-counter or prescription. You may be told to use one or more of the following:  Medicine that is taken by mouth (orally).Medicine that is applied as a cream (topically).Medicine that is inserted directly into the vagina (suppository).Follow these instructions at home:     Lifestyle     Do not have sex until your health care provider approves. Tell your sex partner that you have a yeast infection. That person should go to his or her health care provider and ask if they should also be treated.Do not wear tight clothes, such as pantyhose or tight pants.Wear breathable cotton underwear.General instructions     Take or apply over-the-counter and prescription medicines only as told by your health care provider.Eat more yogurt. This may help to keep your yeast infection from returning.Do not use tampons until your health care provider approves.Try taking a sitz bath to help with discomfort. This is a warm water bath that is taken while you are sitting down. The water should only come up to your hips and should cover your buttocks. Do this 3–4 times per day or as told by your health care provider.Do not douche.If you have diabetes, keep your blood sugar levels under control.Keep all follow-up visits as told by your health care provider. This is important.Contact a health care provider if:  You have a fever.Your symptoms go away and then return.Your symptoms do not get better with treatment.Your symptoms get worse.You have new symptoms.You develop blisters in or around your vagina.You have blood coming from your vagina and it is not your menstrual period.You develop pain in your abdomen.Summary  Vaginal yeast infection is a condition that causes discharge as well as soreness, swelling, and redness (inflammation) of the vagina.This condition is treated with medicine. Medicines may be over-the-counter or prescription.Take or apply over-the-counter and prescription medicines only as told by your health care provider.Do not douche. Do not have sex or use tampons until your health care provider approves.Contact a health care provider if your symptoms do not get better with treatment or your symptoms go away and then return.This information is not intended to replace advice given to you by your health care provider. Make sure you discuss any questions you have with your health care provider.

## 2020-02-24 NOTE — ED PROVIDER NOTE - PROGRESS NOTE DETAILS
Prelim neg. Low suspicion. Will d/c w/ f/u PCP. advised pt prelim results and will call if any changes in final  report.

## 2020-02-24 NOTE — ED PROVIDER NOTE - PATIENT PORTAL LINK FT
You can access the FollowMyHealth Patient Portal offered by [facility] by registering at the following website: [email or web address]. By joining [email or web address]’s FollowMyHealth portal, you will also be able to view your health information using other applications (apps) compatible with our system.

## 2020-02-24 NOTE — ED ADULT NURSE NOTE - OBJECTIVE STATEMENT
c/o congestion x 2 weeks and quotes "could be just allergies. I was sneezing." Pt also reported having whitish, odorless vaginal discharge that started ~ 3 days ago. Denies dysuria or fever.

## 2020-02-24 NOTE — ED ADULT TRIAGE NOTE - CHIEF COMPLAINT QUOTE
Patient c/o abdominal and vaginal discharge since last night . Also with intermitent chest pain and chest congestion for 1 week .

## 2020-02-24 NOTE — ED PROVIDER NOTE - DIAGNOSTIC INTERPRETATION
ER PA: Osiris Sanchez  CHEST XRAY INTERPRETATION: lungs clear, heart shadow normal, bony structures intact

## 2020-02-24 NOTE — ED PROVIDER NOTE - PHYSICAL EXAMINATION
Vitals reviewed  Gen: well appearing, nad, speaking in full sentences  Skin: wwp   HEENT: ncat, eomi, mmm  CV: rrr, no audible m/r/g  Resp: symmetrical expansion, ctab, no w/r/r  Abd: soft/nt, no cvat  Pelvic: thick white d/c, no bleeding, no cmt, no adnexal mass/ttp  Ext: FROM throughout, no peripheral edema/calf ttp, L knee brace, distal sensation intact   Neuro: alert/oriented, no focal deficits, steady gait

## 2020-02-24 NOTE — ED PROVIDER NOTE - ATTENDING CONTRIBUTION TO CARE
Pt p/w 2 weeks of nasal congestion and chest congestion, intermittently, reports seasonal allergies getting worse. No cough, rhinorrhea, f/c, chest tightness. Pt saw PCP for chest tightness, saw PCP, told it muscle pain, prescribed muscle relaxant, which she has not taken. Pt feels it is associated w/ her allergies. The pain is non radiating, not pleuritic, not exertional. Pt also c/o 2 days of white vaginal discharge, itchiness. pt not sexually active in over 1 year since death of . Pt reports abd bloating w/o pain. Pt's main complaint is vag discharge.   EKG NSR, Nml intervals, no ST_T abn.  PERC neg. No sig risk factors for ACS  CHEST XRAY INTERPRETATION: lungs clear, heart shadow normal, bony structures intact Pt p/w 2 weeks of nasal congestion and chest congestion, intermittently, reports seasonal allergies getting worse. No cough, rhinorrhea, f/c, nor SOB. +chest tightness. Pt saw PCP for chest tightness, saw PCP, told it muscle pain, prescribed muscle relaxant, which she has not taken. Pt feels it is associated w/ her allergies. The pain is non radiating, not pleuritic, not exertional. Pt knee surgery near 3 months ago. Pt also c/o 2 days of white vaginal discharge, itchiness. pt not sexually active in over 1 year since death of . Pt reports abd bloating w/o pain. Pt's main complaint is vag discharge.   EKG NSR, Nml intervals, no ST_T abn.  PERC neg. No sig risk factors for ACS  CHEST XRAY INTERPRETATION: lungs clear, heart shadow normal, bony structures intact

## 2020-02-27 NOTE — ED POST DISCHARGE NOTE - DETAILS
D/w pt final CT report. No cough, fever, or SOB. No tx at this time. Pt has PCP she will f/u with. Pt is going to  final report from ED likely tomorrow, 2/28

## 2020-02-29 DIAGNOSIS — R10.9 UNSPECIFIED ABDOMINAL PAIN: ICD-10-CM

## 2020-02-29 DIAGNOSIS — R09.89 OTHER SPECIFIED SYMPTOMS AND SIGNS INVOLVING THE CIRCULATORY AND RESPIRATORY SYSTEMS: ICD-10-CM

## 2020-02-29 DIAGNOSIS — R09.81 NASAL CONGESTION: ICD-10-CM

## 2020-02-29 DIAGNOSIS — R07.89 OTHER CHEST PAIN: ICD-10-CM

## 2020-02-29 DIAGNOSIS — B37.3 CANDIDIASIS OF VULVA AND VAGINA: ICD-10-CM

## 2020-03-16 ENCOUNTER — APPOINTMENT (OUTPATIENT)
Dept: ORTHOPEDIC SURGERY | Facility: CLINIC | Age: 50
End: 2020-03-16
Payer: COMMERCIAL

## 2020-03-16 VITALS — TEMPERATURE: 98.3 F | BODY MASS INDEX: 29.06 KG/M2 | WEIGHT: 164 LBS | HEIGHT: 63 IN

## 2020-03-16 DIAGNOSIS — S86.112A STRAIN OF OTHER MUSCLE(S) AND TENDON(S) OF POSTERIOR MUSCLE GROUP AT LOWER LEG LEVEL, LEFT LEG, INITIAL ENCOUNTER: ICD-10-CM

## 2020-03-16 PROCEDURE — 76882 US LMTD JT/FCL EVL NVASC XTR: CPT | Mod: LT

## 2020-03-16 PROCEDURE — 99213 OFFICE O/P EST LOW 20 MIN: CPT | Mod: 25

## 2020-03-23 ENCOUNTER — APPOINTMENT (OUTPATIENT)
Dept: PULMONOLOGY | Facility: CLINIC | Age: 50
End: 2020-03-23

## 2020-03-26 ENCOUNTER — APPOINTMENT (OUTPATIENT)
Dept: PULMONOLOGY | Facility: CLINIC | Age: 50
End: 2020-03-26

## 2020-04-08 ENCOUNTER — APPOINTMENT (OUTPATIENT)
Dept: PULMONOLOGY | Facility: CLINIC | Age: 50
End: 2020-04-08
Payer: COMMERCIAL

## 2020-04-08 VITALS
TEMPERATURE: 97 F | HEART RATE: 94 BPM | DIASTOLIC BLOOD PRESSURE: 82 MMHG | SYSTOLIC BLOOD PRESSURE: 124 MMHG | WEIGHT: 164 LBS | HEIGHT: 63 IN | OXYGEN SATURATION: 97 % | BODY MASS INDEX: 29.06 KG/M2

## 2020-04-08 PROCEDURE — 99202 OFFICE O/P NEW SF 15 MIN: CPT

## 2020-04-08 NOTE — DISCUSSION/SUMMARY
[FreeTextEntry1] : reveiwed films with her.\par \par will bring back for pfts but i doubt that they'll be anything but normal

## 2020-04-08 NOTE — PHYSICAL EXAM
[No Acute Distress] : no acute distress [Normal Oropharynx] : normal oropharynx [Normal Appearance] : normal appearance [Normal Rate/Rhythm] : normal rate/rhythm [Normal S1, S2] : normal s1, s2 [No Murmurs] : no murmurs [No Resp Distress] : no resp distress [No Abnormalities] : no abnormalities [Normal Gait] : normal gait

## 2020-04-08 NOTE — PROCEDURE
[FreeTextEntry1] : no spirometry due to corona\par \par innocuous findings on ct  no follow up needed

## 2020-11-09 ENCOUNTER — APPOINTMENT (OUTPATIENT)
Dept: ORTHOPEDIC SURGERY | Facility: CLINIC | Age: 50
End: 2020-11-09
Payer: COMMERCIAL

## 2020-11-16 ENCOUNTER — APPOINTMENT (OUTPATIENT)
Dept: RADIOLOGY | Facility: CLINIC | Age: 50
End: 2020-11-16
Payer: COMMERCIAL

## 2020-11-16 ENCOUNTER — RESULT REVIEW (OUTPATIENT)
Age: 50
End: 2020-11-16

## 2020-11-16 ENCOUNTER — OUTPATIENT (OUTPATIENT)
Dept: OUTPATIENT SERVICES | Facility: HOSPITAL | Age: 50
LOS: 1 days | End: 2020-11-16

## 2020-11-16 ENCOUNTER — APPOINTMENT (OUTPATIENT)
Dept: ORTHOPEDIC SURGERY | Facility: CLINIC | Age: 50
End: 2020-11-16
Payer: COMMERCIAL

## 2020-11-16 PROCEDURE — 20610 DRAIN/INJ JOINT/BURSA W/O US: CPT | Mod: LT

## 2020-11-16 PROCEDURE — 99213 OFFICE O/P EST LOW 20 MIN: CPT | Mod: 25

## 2020-11-16 PROCEDURE — 99072 ADDL SUPL MATRL&STAF TM PHE: CPT

## 2020-11-16 PROCEDURE — 73564 X-RAY EXAM KNEE 4 OR MORE: CPT | Mod: 26,50

## 2020-12-10 NOTE — ED ADULT TRIAGE NOTE - AS TEMP SITE
oral Composite Graft Text: The defect edges were debeveled with a #15 scalpel blade.  Given the location of the defect, shape of the defect, the proximity to free margins and the fact the defect was full thickness a composite graft was deemed most appropriate.  The defect was outline and then transferred to the donor site.  A full thickness graft was then excised from the donor site. The graft was then placed in the primary defect, oriented appropriately and then sutured into place.  The secondary defect was then repaired using a primary closure.

## 2021-03-23 NOTE — ED ADULT NURSE NOTE - NSIMPLEMENTINTERV_GEN_ALL_ED
Lactation follow up call. No answer. No voicemail.    Implemented All Universal Safety Interventions:  [place] to call system. Call bell, personal items and telephone within reach. Instruct patient to call for assistance. Room bathroom lighting operational. Non-slip footwear when patient is off stretcher. Physically safe environment: no spills, clutter or unnecessary equipment. Stretcher in lowest position, wheels locked, appropriate side rails in place.

## 2021-04-10 ENCOUNTER — EMERGENCY (EMERGENCY)
Facility: HOSPITAL | Age: 51
LOS: 1 days | Discharge: HOME | End: 2021-04-10
Attending: EMERGENCY MEDICINE | Admitting: EMERGENCY MEDICINE
Payer: COMMERCIAL

## 2021-04-10 VITALS
HEART RATE: 114 BPM | DIASTOLIC BLOOD PRESSURE: 82 MMHG | TEMPERATURE: 100 F | RESPIRATION RATE: 99 BRPM | WEIGHT: 184.97 LBS | HEIGHT: 63 IN | OXYGEN SATURATION: 99 % | SYSTOLIC BLOOD PRESSURE: 129 MMHG

## 2021-04-10 VITALS
RESPIRATION RATE: 18 BRPM | DIASTOLIC BLOOD PRESSURE: 72 MMHG | HEART RATE: 94 BPM | OXYGEN SATURATION: 99 % | TEMPERATURE: 99 F | SYSTOLIC BLOOD PRESSURE: 109 MMHG

## 2021-04-10 DIAGNOSIS — Z91.018 ALLERGY TO OTHER FOODS: ICD-10-CM

## 2021-04-10 DIAGNOSIS — R06.02 SHORTNESS OF BREATH: ICD-10-CM
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
## 2021-04-10 DIAGNOSIS — T78.40XA ALLERGY, UNSPECIFIED, INITIAL ENCOUNTER: ICD-10-CM

## 2021-04-10 DIAGNOSIS — R22.0 LOCALIZED SWELLING, MASS AND LUMP, HEAD: ICD-10-CM

## 2021-04-10 DIAGNOSIS — Z91.013 ALLERGY TO SEAFOOD: ICD-10-CM

## 2021-04-10 DIAGNOSIS — Z79.2 LONG TERM (CURRENT) USE OF ANTIBIOTICS: ICD-10-CM
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
## 2021-04-10 DIAGNOSIS — Z86.16 PERSONAL HISTORY OF COVID-19: ICD-10-CM

## 2021-04-10 PROCEDURE — 99283 EMERGENCY DEPT VISIT LOW MDM: CPT | Mod: 25

## 2021-04-10 PROCEDURE — 71046 X-RAY EXAM CHEST 2 VIEWS: CPT

## 2021-04-10 PROCEDURE — 94640 AIRWAY INHALATION TREATMENT: CPT

## 2021-04-10 PROCEDURE — 71046 X-RAY EXAM CHEST 2 VIEWS: CPT | Mod: 26

## 2021-04-10 PROCEDURE — 99284 EMERGENCY DEPT VISIT MOD MDM: CPT | Mod: 25

## 2021-04-10 RX ORDER — ALBUTEROL 90 UG/1
2 AEROSOL, METERED ORAL EVERY 6 HOURS
Refills: 0 | Status: DISCONTINUED | OUTPATIENT
Start: 2021-04-10 | End: 2021-04-14

## 2021-04-10 RX ORDER — ALBUTEROL 90 UG/1
1 AEROSOL, METERED ORAL ONCE
Refills: 0 | Status: COMPLETED | OUTPATIENT
Start: 2021-04-10 | End: 2021-04-10

## 2021-04-10 RX ORDER — IBUPROFEN 200 MG
600 TABLET ORAL ONCE
Refills: 0 | Status: COMPLETED | OUTPATIENT
Start: 2021-04-10 | End: 2021-04-10

## 2021-04-10 RX ADMIN — Medication 50 MILLIGRAM(S): at 15:41

## 2021-04-10 RX ADMIN — ALBUTEROL 2 PUFF(S): 90 AEROSOL, METERED ORAL at 16:52

## 2021-04-10 RX ADMIN — ALBUTEROL 1 PUFF(S): 90 AEROSOL, METERED ORAL at 15:42

## 2021-04-10 RX ADMIN — Medication 600 MILLIGRAM(S): at 15:38

## 2021-04-10 NOTE — ED PROVIDER NOTE - OBJECTIVE STATEMENT
50 F co reaction to moderna shot- px w [facility] covid 1 year ago- probable covid pneumonia- co persistent sob since covid  got 2nd shot yesterday- this am- felt her face was min swollen, had post ha, sharp w pins and needles and persistent sob- felt better after alb inhaler  px is currently being worked up for asthma- does not have asthma dx  mod severity  no f/c no cough  no n/v  facial swelling resolved after claritin

## 2021-04-10 NOTE — ED ADULT NURSE NOTE - OBJECTIVE STATEMENT
Patient A&Ox4, respirations even and unlabored, speaks in clear and full sentences, no drooling observed, bilateral eye swelling observed. patient reports "I have been having allergy and I am not sure what, but I took the second dose of the vaccine yesterday. Patient in no acute distress.

## 2021-04-10 NOTE — ED PROVIDER NOTE - PATIENT PORTAL LINK FT
You can access the FollowMyHealth Patient Portal offered by [facility] by registering at the following website: [email or web address]. By joining [employer]’s FollowMyHealth portal, you will also be able to view your health information using other applications (apps) compatible with our system.

## 2021-04-10 NOTE — ED ADULT TRIAGE NOTE - TEMPERATURE IN CELSIUS (DEGREES C)
Ice pack given:    ___x__yes _____no    Discharge instructions signed by patient:    ___x__yes _____no    Discharge instructions given to patient:    __x___yes _____no    Discharged via:    __x___amulatory    _____wheelchair    _____stretcher    Stable on discharge:    ___x__yes ____no  Patient would like results over the phone  37.6

## 2021-04-10 NOTE — ED PROVIDER NOTE - NSFOLLOWUPINSTRUCTIONS_ED_ALL_ED_FT
Anaphylactic Reaction, Adult      An anaphylactic reaction (anaphylaxis) is a sudden, severe allergic reaction by the body's disease-fighting system (immune system). Anaphylaxis can be life-threatening. This condition must be treated right away. Sometimes a person may need to be treated in the hospital.      What are the causes?    This condition is caused by exposure to a substance that you are allergic to (allergen). In response to this exposure, the body releases proteins (antibodies) and other compounds, such as histamine, into the bloodstream. This causes swelling in certain tissues and loss of blood pressure to important areas, such as the heart and lungs.  Common allergens that can cause anaphylaxis include:  •Foods, especially peanuts, wheat, shellfish, milk, and eggs.      •Medicines.      •Insect bites or stings.      •Blood or parts of blood received for treatment (transfusions).      •Chemicals, such as dyes, latex, and contrast material that is used for medical tests.        What increases the risk?  This condition is more likely to occur in people who:  •Have allergies.      •Have had anaphylaxis before.      •Have a family history of anaphylaxis.      •Have certain medical conditions, including asthma and eczema.        What are the signs or symptoms?  Symptoms of anaphylaxis may include:  •Feeling warm in the face (flushed). This may include redness.      •Itchy, red, swollen areas of skin (hives).      •Swelling of the eyes, lips, face, mouth, tongue, or throat.      •Difficulty breathing, speaking, or swallowing.      •Noisy breathing (wheezing).      •Dizziness or light-headedness.      •Fainting.      •Pain or cramping in the abdomen.      •Vomiting.      •Diarrhea.        How is this diagnosed?  This condition is diagnosed based on:  •Your symptoms.      •A physical exam.      •Blood tests.      •Recent history of exposure to allergens.        How is this treated?  If you think you are having an anaphylactic reaction, you should do the following right away:  •Give yourself an epinephrine injection using what is commonly called an auto-injector "pen" (pre-filled automatic epinephrine injection device). Your health care provider will teach you how to use an auto-injector pen.    •Call for emergency help. If you use a pen, you must still get emergency medical treatment in the hospital. Treatment in the hospital may include:•Medicines to help:  •Tighten your blood vessels (epinephrine).      •Relieve itching and hives (antihistamines).      •Reduce swelling (corticosteroids).        •Oxygen therapy to help you breathe.      •IV fluids to keep you hydrated.          Follow these instructions at home:    Safety     •Always keep an auto-injector pen near you. This can be lifesaving if you have a severe anaphylactic reaction. Use your auto-injector pen as told by your health care provider.      • Do not drive after an anaphylactic reaction until your health care provider approves.    •Make sure that you, the members of your household, and your employer know:  •What you are allergic to, so it can be avoided.      •How to use an auto-injector pen to give you an epinephrine injection.        •Replace your epinephrine immediately after you use your auto-injector pen. This is important if you have another reaction.      •If told by your health care provider, wear a medical alert bracelet or necklace that states your allergy.      •Learn the signs of anaphylaxis so that you can recognize and treat it right away.      •Work with your health care providers to make an anaphylaxis plan. Preparation is important.      General instructions   •If you have hives or rash:  •Use an over-the-counter antihistamine as told by your health care provider.      •Apply cold, wet cloths (cold compresses) to your skin or take baths or showers in cool water. Avoid hot water.        •Take over-the-counter and prescription medicines only as told by your health care provider.      •Tell all your health care providers that you have an allergy.      •Keep all follow-up visits as told by your health care provider. This is important.        How is this prevented?    •Avoid allergens that have caused an anaphylactic reaction in the past.      •When you are at a restaurant, tell your  that you have an allergy. If you are not sure whether a menu item contains an ingredient that you are allergic to, ask your .        Where to find more information    •American Academy of Allergy, Asthma and Immunology: aaaai.org      •American Academy of Pediatrics: healthychildren.org        Get help right away if:  •You develop symptoms of an allergic reaction. You may notice them soon after you are exposed to a substance. Symptoms may include:  •Flushed skin.      •Hives.      •Swelling of the eyes, lips, face, mouth, tongue, or throat.      •Difficulty breathing, speaking, or swallowing.      •Wheezing.      •Dizziness or light-headedness.      •Fainting.      •Pain or cramping in the abdomen.      •Vomiting.      •Diarrhea.        •You used epinephrine. You need more medical care even if the medicine seems to be working. This is important because anaphylaxis may happen again within 72 hours (rebound anaphylaxis). You may need more doses of epinephrine.    These symptoms may represent a serious problem that is an emergency. Do not wait to see if the symptoms will go away. Do the following right away:    • Use the auto-injector pen as you have been instructed.       • Get medical help. Call your local emergency services (911 in the [place] Do not drive yourself to the hospital.         Summary    •An anaphylactic reaction (anaphylaxis) is a sudden, severe allergic reaction by the body's disease-fighting system (immune system).      •This condition can be life-threatening. If you have an anaphylactic reaction, get medical help right away.      •Your health care provider may teach you how to use an auto-injector "pen" (pre-filled automatic epinephrine injection device) to give yourself a shot.      •Always keep an auto-injector pen with you. This could save your life. Use it as told by your health care provider.      •If you use epinephrine, you must still get emergency medical treatment, even if the medicine seems to be working.      This information is not intended to replace advice given to you by your health care provider. Make sure you discuss any questions you have with your health care provider.      Document Revised: 04/11/2019 Document Reviewed: 04/11/2019    [employer] Patient Education © 2021 [employer]

## 2021-04-10 NOTE — ED PROVIDER NOTE - NS ED MD EM SELECTION
Following review of the vital signs & data available, performance of the physical exam & considering the history that was provided, there does not appear to be an acute medical emergency ongoing that would require hospitalization & prevent this patient from being “medically cleared”.     Within reasonable medical certainty, there is no contributory medical condition  causing any psychiatric complaints presented, there is no concern at present for a medical emergency, and the patient is medically stable for transfer to the intended facility.    The purpose of the Medical Clearance is to provide an assessment of the individual’s current medical condition and stability within the context of a transfer to a clinical treatment setting with appropriate resources to monitor and treat what has been currently diagnosed.    Medical clearance does not indicate the absence of ongoing medical issues that may require further assessment, monitoring and treatment, nor does it guarantee that there are medical conditions that have yet to be diagnosed. Also, by nature of the patient requirig medical clearance, the patient is at risk of self harm. This has been related to the receiving facility and they are expected to take necessary & appropriate precautions.    
[id number] Comprehensive

## 2021-04-10 NOTE — ED PROVIDER NOTE - CLINICAL SUMMARY MEDICAL DECISION MAKING FREE TEXT BOX
mild allergic reaction- resolved after claritin- w persistent sob and asthma-like condition- will give steroids- re allergy/asthma, inhaler, check cxr mild allergic reaction-no resp sx/signs- min facial swelling resolved after claritin- w persistent sob and asthma-like condition- will give steroids- re allergy/asthma, inhaler, check cxr  cxr neg- steroids, inhaler prn- fu w pmd on monday

## 2021-04-10 NOTE — ED ADULT TRIAGE NOTE - CHIEF COMPLAINT QUOTE
Pt presents reporting 2nd moderna shot yesterday. Reports swellingto the eyes and lips 3 hrs ago, took a claritin, swelling now resolved. Reports headache to the back of the head since 5pm yesterday. Took tylenol this am w/o relief. Reports hx of migraines, states this feels similar to past migraines. Reports worsened shortness of breath, reports hx of SOB w/o identified cause over the past year.

## 2021-04-30 ENCOUNTER — NON-APPOINTMENT (OUTPATIENT)
Age: 51
End: 2021-04-30

## 2021-04-30 ENCOUNTER — APPOINTMENT (OUTPATIENT)
Dept: PULMONOLOGY | Facility: CLINIC | Age: 51
End: 2021-04-30
Payer: COMMERCIAL

## 2021-04-30 VITALS
WEIGHT: 183 LBS | TEMPERATURE: 97.7 F | OXYGEN SATURATION: 98 % | SYSTOLIC BLOOD PRESSURE: 120 MMHG | HEART RATE: 91 BPM | BODY MASS INDEX: 32.43 KG/M2 | DIASTOLIC BLOOD PRESSURE: 80 MMHG | HEIGHT: 63 IN

## 2021-04-30 DIAGNOSIS — Z00.00 ENCOUNTER FOR GENERAL ADULT MEDICAL EXAMINATION W/OUT ABNORMAL FINDINGS: ICD-10-CM

## 2021-04-30 DIAGNOSIS — J45.909 UNSPECIFIED ASTHMA, UNCOMPLICATED: ICD-10-CM

## 2021-04-30 PROCEDURE — 99072 ADDL SUPL MATRL&STAF TM PHE: CPT

## 2021-04-30 PROCEDURE — 94010 BREATHING CAPACITY TEST: CPT

## 2021-04-30 PROCEDURE — 99214 OFFICE O/P EST MOD 30 MIN: CPT | Mod: 25

## 2021-05-01 NOTE — DISCUSSION/SUMMARY
[FreeTextEntry1] : i'm quite puzzled she claims that she wheezes, but there is no airflow obstruction and with her documented allergies I'd expect an eleveated niox\par \par will get meth challenge and \par \par ent referral as well.\par \par

## 2021-05-01 NOTE — HISTORY OF PRESENT ILLNESS
[TextBox_4] : had a bad reaction to shot and throat closed up, treated as asthma patient but really doesn't know if that was it.\par \par very allergic by history\par \par even before this was short\par \par pump does help him, \par \par allergy doctor very allergic to dust mite.\par \par nasal,  asteline.\par \par congested in nose.  does not really feel better with nasal steroidds.   found to be allergici to multple enviromental allergens\par \par very anxious and seems to have some issues with following the complexities of her case\par \par i had seen her during the pandemic and could not do spirometry at that time

## 2021-05-01 NOTE — PHYSICAL EXAM
[No Acute Distress] : no acute distress [Normal Appearance] : normal appearance [Normal Rate/Rhythm] : normal rate/rhythm [Normal S1, S2] : normal s1, s2 [No Murmurs] : no murmurs [No Resp Distress] : no resp distress [No Abnormalities] : no abnormalities [Normal Gait] : normal gait [TextBox_11] : severely engorged and obstructed entrance to nasal orifices

## 2021-05-01 NOTE — REVIEW OF SYSTEMS
[Negative] : Endocrine [TextBox_14] : here problems seem to center around severe nasal flow issues [TextBox_30] : sob, not so much todqy [TextBox_57] : allergies??

## 2021-05-03 ENCOUNTER — APPOINTMENT (OUTPATIENT)
Dept: PULMONOLOGY | Facility: CLINIC | Age: 51
End: 2021-05-03
Payer: COMMERCIAL

## 2021-05-03 PROCEDURE — 94070 EVALUATION OF WHEEZING: CPT

## 2021-05-03 PROCEDURE — 95070 INHLJ BRNCL CHALLENGE TSTG: CPT

## 2021-05-03 PROCEDURE — 99072 ADDL SUPL MATRL&STAF TM PHE: CPT

## 2021-05-04 NOTE — PROCEDURE
[FreeTextEntry1] : meth challenge negative\par \par patient had difficulty blowing out for more than a few seconds but still a good study\par \par will be seeing ent specailst

## 2021-05-14 ENCOUNTER — APPOINTMENT (OUTPATIENT)
Dept: OTOLARYNGOLOGY | Facility: CLINIC | Age: 51
End: 2021-05-14
Payer: COMMERCIAL

## 2021-05-14 VITALS
HEIGHT: 63 IN | SYSTOLIC BLOOD PRESSURE: 125 MMHG | WEIGHT: 186 LBS | BODY MASS INDEX: 32.96 KG/M2 | TEMPERATURE: 97.4 F | HEART RATE: 80 BPM | DIASTOLIC BLOOD PRESSURE: 79 MMHG

## 2021-05-14 DIAGNOSIS — J31.0 CHRONIC RHINITIS: ICD-10-CM

## 2021-05-14 PROCEDURE — 31575 DIAGNOSTIC LARYNGOSCOPY: CPT

## 2021-05-14 PROCEDURE — 99204 OFFICE O/P NEW MOD 45 MIN: CPT | Mod: 25

## 2021-05-14 RX ORDER — FLUTICASONE PROPIONATE 50 UG/1
50 SPRAY, METERED NASAL DAILY
Qty: 1 | Refills: 5 | Status: ACTIVE | COMMUNITY
Start: 2021-05-14 | End: 1900-01-01

## 2021-05-14 RX ORDER — CETIRIZINE HYDROCHLORIDE 10 MG/1
10 TABLET, FILM COATED ORAL DAILY
Qty: 30 | Refills: 5 | Status: ACTIVE | COMMUNITY
Start: 2021-05-14 | End: 1900-01-01

## 2021-05-14 NOTE — PROCEDURE
[FreeTextEntry3] : Nasal Endoscopy:\par leftward septal deviation\par moderate inferior turbinate hypertrophy\par bulbous right MT\par no mucopus or polyps\par choana clear\par \par Fiberoptic Laryngoscopy:\par upper airway widely patent\par TVF symmetric and mobile\par boggy postcricoid edema

## 2021-05-14 NOTE — CONSULT LETTER
[Dear  ___] : Dear  [unfilled], [Courtesy Letter:] : I had the pleasure of seeing your patient, [unfilled], in my office today. [Consult Closing:] : Thank you very much for allowing me to participate in the care of this patient.  If you have any questions, please do not hesitate to contact me. [Sincerely,] : Sincerely, [FreeTextEntry3] : Emil Clancy MD\par Department of Otolaryngology - Head and Neck Surgery\par [facility] [DrCarly  ___] : Dr. WOLF

## 2021-05-14 NOTE — HISTORY OF PRESENT ILLNESS
[de-identified] : 50F here for initial evaluation.\par \par For the past yr, she c/o nasal congestion/obstruction, watery eyes, scratchy throat. \par Claritin used to help, but not anymore. Astelin also alarcon snot work. \par She has allergies on prior testing (dust, environmental) and had been recommended to get shots, but never did. She is not on nasal steroids.\par There is no h/o sinusitis - no green rhinorrhea, no foul nasal odor, no facial pain.\par \par ROS otherwise unremarkable.

## 2021-05-14 NOTE — ASSESSMENT
[FreeTextEntry1] : 50F here for initial evaluation. For the past yr, she c/o nasal congestion/obstruction, watery eyes, scratchy throat. \par Claritin used to help, but not anymore. Astelin also does not work. She has allergies on prior testing (dust, environmental) and had been recommended to get shots, but never did. She is not on nasal steroids. There is no h/o sinusitis - no green rhinorrhea, no foul nasal odor, no facial pain. No known reflux; diet is not conducive.\par On exam, nasal endoscopy shows leftward septal deviation and moderate inferior turbinate hypertrophy w sinonasal mucosal edema. Fiberoptic laryngoscopy shows boggy postcricoid edema.\par Seems like sx most likely due to uncontrolled allergies - allergic rhinitis, turbinate hypertrophy. There may also be uncontrolled laryngopharyngeal reflux too given laryngoscopy. To start flonase/zyrtec. Go back to allergist. Strict diet/lifestyle changes. RTO 2 months.

## 2021-05-16 LAB — SARS-COV-2 N GENE NPH QL NAA+PROBE: NOT DETECTED

## 2021-06-11 ENCOUNTER — APPOINTMENT (OUTPATIENT)
Dept: OTOLARYNGOLOGY | Facility: CLINIC | Age: 51
End: 2021-06-11

## 2021-07-05 ENCOUNTER — EMERGENCY (EMERGENCY)
Facility: HOSPITAL | Age: 51
LOS: 1 days | Discharge: HOME | End: 2021-07-05
Attending: EMERGENCY MEDICINE | Admitting: EMERGENCY MEDICINE
Payer: COMMERCIAL

## 2021-07-05 VITALS
OXYGEN SATURATION: 99 % | TEMPERATURE: 99 F | SYSTOLIC BLOOD PRESSURE: 139 MMHG | HEIGHT: 63 IN | RESPIRATION RATE: 18 BRPM | HEART RATE: 88 BPM | WEIGHT: 184.97 LBS | DIASTOLIC BLOOD PRESSURE: 86 MMHG

## 2021-07-05 VITALS
HEART RATE: 82 BPM | RESPIRATION RATE: 16 BRPM | SYSTOLIC BLOOD PRESSURE: 117 MMHG | DIASTOLIC BLOOD PRESSURE: 78 MMHG | OXYGEN SATURATION: 99 % | TEMPERATURE: 99 F

## 2021-07-05 DIAGNOSIS — Z86.16 PERSONAL HISTORY OF COVID-19: ICD-10-CM

## 2021-07-05 DIAGNOSIS — Z20.822 CONTACT WITH AND (SUSPECTED) EXPOSURE TO COVID-19: ICD-10-CM

## 2021-07-05 DIAGNOSIS — R10.13 EPIGASTRIC PAIN: ICD-10-CM

## 2021-07-05 DIAGNOSIS — Z91.018 ALLERGY TO OTHER FOODS: ICD-10-CM

## 2021-07-05 DIAGNOSIS — Z91.013 ALLERGY TO SEAFOOD: ICD-10-CM

## 2021-07-05 DIAGNOSIS — Z86.19 PERSONAL HISTORY OF OTHER INFECTIOUS AND PARASITIC DISEASES: ICD-10-CM

## 2021-07-05 LAB
ALBUMIN SERPL ELPH-MCNC: 4.2 G/DL — SIGNIFICANT CHANGE UP (ref 3.3–5)
ALP SERPL-CCNC: 83 U/L — SIGNIFICANT CHANGE UP (ref 40–120)
ALT FLD-CCNC: 13 U/L — SIGNIFICANT CHANGE UP (ref 10–45)
ANION GAP SERPL CALC-SCNC: 10 MMOL/L — SIGNIFICANT CHANGE UP (ref 5–17)
APPEARANCE UR: CLEAR — SIGNIFICANT CHANGE UP
AST SERPL-CCNC: 18 U/L — SIGNIFICANT CHANGE UP (ref 10–40)
BASOPHILS # BLD AUTO: 0.05 K/UL — SIGNIFICANT CHANGE UP (ref 0–0.2)
BASOPHILS NFR BLD AUTO: 0.7 % — SIGNIFICANT CHANGE UP (ref 0–2)
BILIRUB SERPL-MCNC: 0.2 MG/DL — SIGNIFICANT CHANGE UP (ref 0.2–1.2)
BILIRUB UR-MCNC: NEGATIVE — SIGNIFICANT CHANGE UP
BUN SERPL-MCNC: 14 MG/DL — SIGNIFICANT CHANGE UP (ref 7–23)
CALCIUM SERPL-MCNC: 9.5 MG/DL — SIGNIFICANT CHANGE UP (ref 8.4–10.5)
CHLORIDE SERPL-SCNC: 101 MMOL/L — SIGNIFICANT CHANGE UP (ref 96–108)
CO2 SERPL-SCNC: 27 MMOL/L — SIGNIFICANT CHANGE UP (ref 22–31)
COLOR SPEC: YELLOW — SIGNIFICANT CHANGE UP
CREAT SERPL-MCNC: 1.03 MG/DL — SIGNIFICANT CHANGE UP (ref 0.5–1.3)
DIFF PNL FLD: NEGATIVE — SIGNIFICANT CHANGE UP
EOSINOPHIL # BLD AUTO: 0.4 K/UL — SIGNIFICANT CHANGE UP (ref 0–0.5)
EOSINOPHIL NFR BLD AUTO: 5.6 % — SIGNIFICANT CHANGE UP (ref 0–6)
GLUCOSE SERPL-MCNC: 88 MG/DL — SIGNIFICANT CHANGE UP (ref 70–99)
GLUCOSE UR QL: NEGATIVE — SIGNIFICANT CHANGE UP
HCG UR QL: NEGATIVE — SIGNIFICANT CHANGE UP
HCT VFR BLD CALC: 36.9 % — SIGNIFICANT CHANGE UP (ref 34.5–45)
HGB BLD-MCNC: 11.5 G/DL — SIGNIFICANT CHANGE UP (ref 11.5–15.5)
IMM GRANULOCYTES NFR BLD AUTO: 0.3 % — SIGNIFICANT CHANGE UP (ref 0–1.5)
KETONES UR-MCNC: NEGATIVE — SIGNIFICANT CHANGE UP
LACTATE SERPL-SCNC: 1 MMOL/L — SIGNIFICANT CHANGE UP (ref 0.5–2)
LEUKOCYTE ESTERASE UR-ACNC: NEGATIVE — SIGNIFICANT CHANGE UP
LIDOCAIN IGE QN: 37 U/L — SIGNIFICANT CHANGE UP (ref 7–60)
LYMPHOCYTES # BLD AUTO: 2.27 K/UL — SIGNIFICANT CHANGE UP (ref 1–3.3)
LYMPHOCYTES # BLD AUTO: 31.8 % — SIGNIFICANT CHANGE UP (ref 13–44)
MCHC RBC-ENTMCNC: 27.4 PG — SIGNIFICANT CHANGE UP (ref 27–34)
MCHC RBC-ENTMCNC: 31.2 GM/DL — LOW (ref 32–36)
MCV RBC AUTO: 88.1 FL — SIGNIFICANT CHANGE UP (ref 80–100)
MONOCYTES # BLD AUTO: 0.6 K/UL — SIGNIFICANT CHANGE UP (ref 0–0.9)
MONOCYTES NFR BLD AUTO: 8.4 % — SIGNIFICANT CHANGE UP (ref 2–14)
NEUTROPHILS # BLD AUTO: 3.8 K/UL — SIGNIFICANT CHANGE UP (ref 1.8–7.4)
NEUTROPHILS NFR BLD AUTO: 53.2 % — SIGNIFICANT CHANGE UP (ref 43–77)
NITRITE UR-MCNC: NEGATIVE — SIGNIFICANT CHANGE UP
NRBC # BLD: 0 /100 WBCS — SIGNIFICANT CHANGE UP (ref 0–0)
PH UR: 6 — SIGNIFICANT CHANGE UP (ref 5–8)
PLATELET # BLD AUTO: 296 K/UL — SIGNIFICANT CHANGE UP (ref 150–400)
POTASSIUM SERPL-MCNC: 3.9 MMOL/L — SIGNIFICANT CHANGE UP (ref 3.5–5.3)
POTASSIUM SERPL-SCNC: 3.9 MMOL/L — SIGNIFICANT CHANGE UP (ref 3.5–5.3)
PROT SERPL-MCNC: 7.2 G/DL — SIGNIFICANT CHANGE UP (ref 6–8.3)
PROT UR-MCNC: NEGATIVE MG/DL — SIGNIFICANT CHANGE UP
RBC # BLD: 4.19 M/UL — SIGNIFICANT CHANGE UP (ref 3.8–5.2)
RBC # FLD: 15.2 % — HIGH (ref 10.3–14.5)
SARS-COV-2 RNA SPEC QL NAA+PROBE: NEGATIVE — SIGNIFICANT CHANGE UP
SODIUM SERPL-SCNC: 138 MMOL/L — SIGNIFICANT CHANGE UP (ref 135–145)
SP GR SPEC: 1.02 — SIGNIFICANT CHANGE UP (ref 1–1.03)
UROBILINOGEN FLD QL: 0.2 E.U./DL — SIGNIFICANT CHANGE UP
WBC # BLD: 7.14 K/UL — SIGNIFICANT CHANGE UP (ref 3.8–10.5)
WBC # FLD AUTO: 7.14 K/UL — SIGNIFICANT CHANGE UP (ref 3.8–10.5)

## 2021-07-05 PROCEDURE — 96361 HYDRATE IV INFUSION ADD-ON: CPT

## 2021-07-05 PROCEDURE — 76705 ECHO EXAM OF ABDOMEN: CPT

## 2021-07-05 PROCEDURE — 76705 ECHO EXAM OF ABDOMEN: CPT | Mod: 26

## 2021-07-05 PROCEDURE — 83605 ASSAY OF LACTIC ACID: CPT

## 2021-07-05 PROCEDURE — 83690 ASSAY OF LIPASE: CPT

## 2021-07-05 PROCEDURE — 99284 EMERGENCY DEPT VISIT MOD MDM: CPT

## 2021-07-05 PROCEDURE — 85025 COMPLETE CBC W/AUTO DIFF WBC: CPT

## 2021-07-05 PROCEDURE — 99284 EMERGENCY DEPT VISIT MOD MDM: CPT | Mod: 25

## 2021-07-05 PROCEDURE — 87635 SARS-COV-2 COVID-19 AMP PRB: CPT

## 2021-07-05 PROCEDURE — 81003 URINALYSIS AUTO W/O SCOPE: CPT

## 2021-07-05 PROCEDURE — 81025 URINE PREGNANCY TEST: CPT

## 2021-07-05 PROCEDURE — 36415 COLL VENOUS BLD VENIPUNCTURE: CPT

## 2021-07-05 PROCEDURE — 80053 COMPREHEN METABOLIC PANEL: CPT

## 2021-07-05 PROCEDURE — 96360 HYDRATION IV INFUSION INIT: CPT

## 2021-07-05 RX ORDER — FAMOTIDINE 10 MG/ML
1 INJECTION INTRAVENOUS
Qty: 20 | Refills: 0
Start: 2021-07-05

## 2021-07-05 RX ORDER — SODIUM CHLORIDE 9 MG/ML
1000 INJECTION INTRAMUSCULAR; INTRAVENOUS; SUBCUTANEOUS ONCE
Refills: 0 | Status: COMPLETED | OUTPATIENT
Start: 2021-07-05 | End: 2021-07-05

## 2021-07-05 RX ORDER — FAMOTIDINE 10 MG/ML
20 INJECTION INTRAVENOUS ONCE
Refills: 0 | Status: COMPLETED | OUTPATIENT
Start: 2021-07-05 | End: 2021-07-05

## 2021-07-05 RX ADMIN — FAMOTIDINE 20 MILLIGRAM(S): 10 INJECTION INTRAVENOUS at 21:13

## 2021-07-05 RX ADMIN — SODIUM CHLORIDE 1000 MILLILITER(S): 9 INJECTION INTRAMUSCULAR; INTRAVENOUS; SUBCUTANEOUS at 21:13

## 2021-07-05 RX ADMIN — Medication 30 MILLILITER(S): at 21:13

## 2021-07-05 NOTE — ED PROVIDER NOTE - PATIENT PORTAL LINK FT
You can access the FollowMyHealth Patient Portal offered by [facility] by registering at the following website: [email or web address]. By joining [facility] FollowMyHealth portal, you will also be able to view your health information using other applications (apps) compatible with our system.

## 2021-07-05 NOTE — ED PROVIDER NOTE - CLINICAL SUMMARY MEDICAL DECISION MAKING FREE TEXT BOX
Epigastric pain, hx of H. pylori in the past; hx suspicious for gastritis/duodenitis. No s/s to suggest acute GI bleed.  HD stable.  No chest pain or SOB  to suggest ACS.  Tender in epigastric region.  Plan: labs, GI cocktail, RUQ US.

## 2021-07-05 NOTE — ED PROVIDER NOTE - NSFOLLOWUPINSTRUCTIONS_ED_ALL_ED_FT
Take famotidine (Pepcid) as prescribed.  Follow up with primary care physician in 1-3 days.  Please also make arrangements for follow up with gastroenterologist.    Return to the Emergency Department if you have any new or worsening symptoms, or if you have any concerns.  ========================    Abdominal Pain    WHAT YOU NEED TO KNOW:    Abdominal pain can be dull, achy, or sharp. You may have pain in one area of your abdomen, or in your entire abdomen. Your pain may be caused by a condition such as constipation, food sensitivity or poisoning, infection, or a blockage. Abdominal pain can also be from a hernia, appendicitis, or an ulcer. Liver, gallbladder, or kidney conditions can also cause abdominal pain. The cause of your abdominal pain may be unknown.     DISCHARGE INSTRUCTIONS:    Return to the emergency department if:   •You have new chest pain or shortness of breath.      •You have pulsing pain in your upper abdomen or lower back that suddenly becomes constant.      •Your pain is in the right lower abdominal area and worsens with movement.       •You have a fever over 100.4°F (38°C) or shaking chills.       •You are vomiting and cannot keep food or liquids down.       •Your pain does not improve or gets worse over the next 8 to 12 hours.       •You see blood in your vomit or bowel movements, or they look black and tarry.       •Your skin or the whites of your eyes turn yellow.       •You are a woman and have a large amount of vaginal bleeding that is not your monthly period.       Contact your healthcare provider if:   •You have pain in your lower back.       •You are a man and have pain in your testicles.      •You have pain when you urinate.       •You have questions or concerns about your condition or care.      Follow up with your healthcare provider within 24 hours or as directed: Write down your questions so you remember to ask them during your visits.     Medicines:   •Medicines may be given to calm your stomach and prevent vomiting or to decrease pain. Ask how to take pain medicine safely.      •Take your medicine as directed. Contact your healthcare provider if you think your medicine is not helping or if you have side effects. Tell him of her if you are allergic to any medicine. Keep a list of the medicines, vitamins, and herbs you take. Include the amounts, and when and why you take them. Bring the list or the pill bottles to follow-up visits. Carry your medicine list with you in case of an emergency.

## 2021-07-05 NOTE — ED PROVIDER NOTE - PHYSICAL EXAMINATION
CONSTITUTIONAL: WD,WN. NAD.    SKIN: Normal color and turgor.    HEAD: NC/AT.  EYES: Conjunctiva clear. EOMI. PERRL.    ENT: Airway clear. Normal voice.   RESPIRATORY:  Normal work of breathing. Lungs CTAB.  CARDIOVASCULAR:  RRR, S1S2. No M/R/G.      GI:  Abdomen soft, with epigastric tenderness.  Neg Forman sign. No ventral hernia appreciated. No CVAT.   MSK: Neck supple.  No lower extremity edema or calf tenderness.  No joint swelling or ROM limitation.  NEURO: Alert and oriented; CN II-XII grossly intact. Speech clear. 5/5 strength in all extremities.  Good balance. Steady gait.

## 2021-07-05 NOTE — ED PROVIDER NOTE - CARE PROVIDERS DIRECT ADDRESSES
[email or web address]

## 2021-07-05 NOTE — ED PROVIDER NOTE - OBJECTIVE STATEMENT
49 yo fem with Hx Covid over a year ago, hx of adenomyosis of uterus, H. pylori about a year and a half ago c/o abdominal pain.  Says she frequently gets lower abdominal discomfort due to yeast infections and she had that discomfort last week; but over the past few days she has developed pain in the epigastric region.  It is a sharp pain, worse after eating and worse when she leans forward; improves when she lays down.  There is no chest pain or dyspnea. 49 yo fem with Hx Covid over a year ago, hx of adenomyosis of uterus, H. pylori about a year and a half ago c/o abdominal pain.  Says she frequently gets lower abdominal discomfort due to yeast infections and she had that discomfort last week; but over the past few days she has developed pain in the epigastric region.  It is a sharp pain, worse after eating and worse when she leans forward; improves when she lays down.  She has not tried taking anything for the pain; she was going to try PeptoBismol but says the pain was a little better yesterday, so she didn't take it.  The pain increased again today, so she came to the ED for evaluation.      There is no chest pain or dyspnea. No F/C, nausea/vomiting, diarrhea, bloody stool or melena, urinary symptoms.  She says she worries because her   3 yrs ago of gastric cancer. She has never had an endoscopy.  Does not drink alcohol or smoke cigarettes.  Rare NSAID use when needed for adenomyosis. 51 yo fem with Hx Covid over a year ago, hx of adenomyosis of uterus, H. pylori about a year and a half ago c/o abdominal pain.  Says she frequently gets lower abdominal discomfort due to yeast infections and adenomyosis; she had that discomfort last week; but over the past few days she has developed pain in the epigastric region.  It is a sharp pain, worse after eating and worse when she leans forward; improves when she lays down.  She has not tried taking anything for the pain; she was going to try PeptoBismol but says the pain was a little better yesterday, so she didn't take it.  The pain increased again today, so she came to the ED for evaluation.      There is no chest pain or dyspnea. No F/C, nausea/vomiting, diarrhea, bloody stool or melena, urinary symptoms.  She says she worries because her   3 yrs ago of gastric cancer. She has never had an endoscopy.  Does not drink alcohol or smoke cigarettes.  Rare NSAID use when needed for adenomyosis.

## 2021-07-05 NOTE — ED PROVIDER NOTE - PROVIDER TOKENS
PROVIDER:[TOKEN:[[id number]]],PROVIDER:[TOKEN:[[id number]]],PROVIDER:[TOKEN:[[id number]]],PROVIDER:[TOKEN:[[id number]]]

## 2021-07-05 NOTE — ED PROVIDER NOTE - CARE PROVIDER_API CALL
Darnell Tabor)  Medicine  [address], [address]  [place] [address]  Phone: [phone]  Fax: [phone]  Follow Up Time:     Jose Thompson  GASTROENTEROLOGY  [address] [address]  Phone: [phone]  Fax: [phone]  Follow Up Time:     Philip Cordero)  Gastroenterology  [address], [address] [address]  Phone: [phone]  Fax: [phone]  Follow Up Time:     Alphonso Horan)  Gastroenterology; Internal Medicine  [address] [place] [address]  Phone: [phone]  Fax: [phone]  Follow Up Time:

## 2021-07-05 NOTE — ED ADULT NURSE NOTE - OBJECTIVE STATEMENT
received A&OX4 ambulatory 50years old female pt with c/o of " I have pain in the abdomen for days." currently, pt reports abdominal pain in the upper abdomen received A&OX4 ambulatory 50years old female pt with c/o of " I have pain in the abdomen for days." currently, pt reports abdominal pain in the upper abdomen, the pain increases when palpated. abdomen non-distended, non-tender, and soft to touch. pt denies hx of gallstones. no dysuria, hematuria, or burning sensation when urinated. pt denies nausea, vomiting, diarrhea, or fever. denies chest pain. pt didn't take anything for pain.

## 2021-07-05 NOTE — ED PROVIDER NOTE - PROGRESS NOTE DETAILS
Abdominal pain resolved.  Stable for DC.  patient says she will follow up with her new PCP, and will follow up with GI.  Return precautions given, pt expressed clear understanding.

## 2021-07-06 RX ADMIN — SODIUM CHLORIDE 1000 MILLILITER(S): 9 INJECTION INTRAMUSCULAR; INTRAVENOUS; SUBCUTANEOUS at 00:04

## 2021-07-26 ENCOUNTER — APPOINTMENT (OUTPATIENT)
Dept: ORTHOPEDIC SURGERY | Facility: CLINIC | Age: 51
End: 2021-07-26
Payer: COMMERCIAL

## 2021-07-26 VITALS — WEIGHT: 190 LBS | HEIGHT: 63 IN | RESPIRATION RATE: 16 BRPM | BODY MASS INDEX: 33.66 KG/M2

## 2021-07-26 PROCEDURE — 99214 OFFICE O/P EST MOD 30 MIN: CPT | Mod: 25

## 2021-07-26 PROCEDURE — 20610 DRAIN/INJ JOINT/BURSA W/O US: CPT | Mod: LT

## 2021-08-30 NOTE — ED PROVIDER NOTE - DATE/TIME 1
Patient calling about possibly getting an order for a Lumbar MRI and would like to discuss a medication with the nurse.   05-Jul-2021 23:33

## 2021-11-22 ENCOUNTER — OUTPATIENT (OUTPATIENT)
Dept: OUTPATIENT SERVICES | Facility: HOSPITAL | Age: 51
LOS: 1 days | End: 2021-11-22
Payer: COMMERCIAL

## 2021-11-22 ENCOUNTER — RESULT REVIEW (OUTPATIENT)
Age: 51
End: 2021-11-22

## 2021-11-22 ENCOUNTER — APPOINTMENT (OUTPATIENT)
Dept: ORTHOPEDIC SURGERY | Facility: CLINIC | Age: 51
End: 2021-11-22
Payer: COMMERCIAL

## 2021-11-22 VITALS — BODY MASS INDEX: 33.66 KG/M2 | WEIGHT: 190 LBS | HEIGHT: 63 IN | RESPIRATION RATE: 16 BRPM

## 2021-11-22 PROCEDURE — 72170 X-RAY EXAM OF PELVIS: CPT

## 2021-11-22 PROCEDURE — 99214 OFFICE O/P EST MOD 30 MIN: CPT | Mod: 25

## 2021-11-22 PROCEDURE — 72110 X-RAY EXAM L-2 SPINE 4/>VWS: CPT

## 2021-11-22 PROCEDURE — 20610 DRAIN/INJ JOINT/BURSA W/O US: CPT | Mod: RT

## 2021-11-22 PROCEDURE — 72170 X-RAY EXAM OF PELVIS: CPT | Mod: 26

## 2021-11-22 PROCEDURE — 72110 X-RAY EXAM L-2 SPINE 4/>VWS: CPT | Mod: 26

## 2021-11-22 RX ORDER — MELOXICAM 15 MG/1
15 TABLET ORAL
Qty: 30 | Refills: 0 | Status: DISCONTINUED | COMMUNITY
Start: 2019-10-08 | End: 2021-11-22

## 2021-11-22 RX ORDER — MELOXICAM 15 MG/1
15 TABLET ORAL
Qty: 30 | Refills: 0 | Status: DISCONTINUED | COMMUNITY
Start: 2019-09-17 | End: 2021-11-22

## 2021-11-22 RX ORDER — DICLOFENAC SODIUM 1% 10 MG/G
1 GEL TOPICAL DAILY
Qty: 3 | Refills: 3 | Status: ACTIVE | COMMUNITY
Start: 2021-11-22 | End: 1900-01-01

## 2021-11-22 RX ORDER — HYDROCODONE BITARTRATE AND ACETAMINOPHEN 5; 325 MG/1; MG/1
5-325 TABLET ORAL
Qty: 30 | Refills: 0 | Status: DISCONTINUED | COMMUNITY
Start: 2019-11-21 | End: 2021-11-22

## 2021-11-22 RX ORDER — MELOXICAM 15 MG/1
15 TABLET ORAL DAILY
Qty: 1 | Refills: 3 | Status: DISCONTINUED | COMMUNITY
Start: 2019-10-23 | End: 2021-11-22

## 2021-11-22 NOTE — PROCEDURE
[de-identified] : Procedure: Kenalog injection of the bilateral knee joint \par Indication:  Inflammation and Joint pain, mild osteoarthritis\par Risk, benefits and alternatives were discussed with the patient. Potential complications include bleeding and infection. \par Alcohol was used to prep the area.  Ethyl chloride spray was used as a topical anesthetic.  Using sterile technique, the aspiration/injection needle was then directed from a lateral and superior aspect.  The procedure was not ultrasound guided.  A 1.5 inch 21g needle was used to inject 3 mL of 1% Lidocaine, 3 mL 0.25% Bupivacaine and 1 mL 40mg/mL triamcinolone.  A bandage was applied.  The patient tolerated the procedure well. \par Procedure was repeated in identical fashion for the contralateral knee\par Complications: None. \par Patient instructed to avoid strenuous activity for 2 day(s). \par Follow-up in the office as needed, in 3 months for repeat injection, if pain remains unresolved, or for further concerns.  Specifically counseled regarding the signs and symptoms of potential intraarticular infection and instructed to present promptly to clinic or hospital if such signs and symptoms arise.\par

## 2021-11-22 NOTE — PHYSICAL EXAM
[de-identified] : Bilateral knee:\par \par Examination of the involved knee was performed inclusive of the following tests:\par \par Inspection:  effusion,quadriceps atrophy, skin\par \par Gait: stride length, jose, heel strike\par \par Range of Motion: active and passive with comparison to contralateral knee\par \par Strength Testing: flexion and extention\par \par Tenderness Evaluation: medial and lateral joint line, medial and lateral patellar facet, quadriceps and patellar tendon, hamstring, pes anserinus\par \par Stability: varus and valgus at 0 and 30 degrees (1-3+), Lachman (1A unless noted),  anterior drawer (1-3+), posterior drawer (1-3+), pivot-shift (normal, glide, shift)\par \par Meniscus: Sharan, Thessaly\par \par Patellofemoral: patellar grind, patellar compression, tracking, J-sign, tilt, test, apprehension, medial and lateral translation (2 quadrants unless otherwise noted)\par \par Abnormal findings were as follows:\par \par Bilateral knee examination continues to demonstrate mild medial compartment medial joint line tenderness pain with Sharan's mild pain with patellar compression grind test trace crepitus trace effusion.  She has full range of motion today.  She does demonstrate an antalgic gait on the right.\par \par \par \par Lumbar spine:\par Lumbar spine examination today demonstrates range of motion within normal limits overall.  She does have painful arc of motion in extension and side bending to the right.  She has mild right-sided sacroiliac joint discomfort.  She has a positive straight leg raise on the right with radiation down the L5 dermatome.  She has some mild sensory deficits to light touch in the L5 dermatomal region.  Strength testing from L2-S1 is within normal limits bilaterally and symmetric.  She has intact 2+ deep tendon reflexes.\par  [de-identified] : Lumbar spine:\par Lumbar spine x-ray series including AP lateral flexion-extension views was obtained and reviewed.  The images demonstrate trace narrowing and sclerosis of the L4-5 and L5S1 disc spaces.  There is also trace L4-L5 anterolisthesis on flexion views only.  These changes are relatively minimal overall.\par \par Hip x-rays:\par AP pelvis and 4590 degree Sanchez views demonstrate generally well-maintained hip joint space without major degenerative changes.  There are some slight sclerosis and edge osteophytes on the femoral head on the right.  Joint space is maintained as described.  There is significant over coverage bilaterally with the center edge angle on AP views measuring up to 50 degrees.

## 2021-11-22 NOTE — REASON FOR VISIT
[Follow-Up Visit] : a follow-up visit for [FreeTextEntry2] : Follow-up bilateral knee mild osteoarthritis, radiating right lower extremity pain

## 2021-11-22 NOTE — DISCUSSION/SUMMARY
[Medication Risks Reviewed] : Medication risks reviewed [Surgical risks reviewed] : Surgical risks reviewed [de-identified] : 51-year-old female with mild bilateral knee osteoarthritis and new radicular right lower extremity pain.  With regards to the bilateral knee osteoarthritis a repeat steroid injection was provided.  I believe the more problematic radiating right lower extremity pain today however may be related to her lumbar spine.  We will begin a therapy program.  If her symptoms fail to improve consider MRI in the future.  She will follow-up in 6 to 8 weeks as needed

## 2021-11-22 NOTE — HISTORY OF PRESENT ILLNESS
[de-identified] : 51-year-old female with known mild bilateral osteoarthritis in the setting of meniscal root tears.  She has been managed previously with corticosteroid injections.  Last injection was in August with excellent effect but only lasted 1 month.  She returns with complaint of increasing pain in the knees as well as increasing right lower extremity pain.  She states today that the pain in some ways is similar to prior episodes but she is also having newer pain which begins at the knee and travels down the lateral and anterolateral aspect of the lower leg.  Occasionally leg feels cold.  She has sensation that changes into the dorsal aspect of her middle toes.  The leg sometimes feels heavy or asleep and requires her to shake her leg.  The symptoms are all new in comparison to her prior knee pain which is also present and is more consistent with achy deep discomfort localized within the knees medially and in the retropatellar region.

## 2022-01-24 ENCOUNTER — APPOINTMENT (OUTPATIENT)
Dept: ORTHOPEDIC SURGERY | Facility: CLINIC | Age: 52
End: 2022-01-24
Payer: COMMERCIAL

## 2022-02-02 ENCOUNTER — APPOINTMENT (OUTPATIENT)
Dept: ORTHOPEDIC SURGERY | Facility: CLINIC | Age: 52
End: 2022-02-02
Payer: COMMERCIAL

## 2022-02-02 DIAGNOSIS — M54.16 RADICULOPATHY, LUMBAR REGION: ICD-10-CM

## 2022-02-02 PROCEDURE — 99213 OFFICE O/P EST LOW 20 MIN: CPT

## 2022-02-04 ENCOUNTER — EMERGENCY (EMERGENCY)
Facility: HOSPITAL | Age: 52
LOS: 1 days | Discharge: HOME | End: 2022-02-04
Attending: EMERGENCY MEDICINE | Admitting: EMERGENCY MEDICINE
Payer: COMMERCIAL

## 2022-02-04 VITALS
HEIGHT: 63 IN | RESPIRATION RATE: 18 BRPM | DIASTOLIC BLOOD PRESSURE: 89 MMHG | TEMPERATURE: 98 F | SYSTOLIC BLOOD PRESSURE: 141 MMHG | OXYGEN SATURATION: 97 % | HEART RATE: 87 BPM

## 2022-02-04 DIAGNOSIS — S09.90XA UNSPECIFIED INJURY OF HEAD, INITIAL ENCOUNTER: ICD-10-CM
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
## 2022-02-04 DIAGNOSIS — Z91.013 ALLERGY TO SEAFOOD: ICD-10-CM

## 2022-02-04 DIAGNOSIS — M54.2 CERVICALGIA: ICD-10-CM

## 2022-02-04 DIAGNOSIS — I10 ESSENTIAL (PRIMARY) HYPERTENSION: ICD-10-CM

## 2022-02-04 PROCEDURE — 70450 CT HEAD/BRAIN W/O DYE: CPT | Mod: MG

## 2022-02-04 PROCEDURE — 72125 CT NECK SPINE W/O DYE: CPT | Mod: MG

## 2022-02-04 PROCEDURE — 99285 EMERGENCY DEPT VISIT HI MDM: CPT

## 2022-02-04 PROCEDURE — 70450 CT HEAD/BRAIN W/O DYE: CPT | Mod: 26,MG

## 2022-02-04 PROCEDURE — G1004: CPT

## 2022-02-04 PROCEDURE — 72125 CT NECK SPINE W/O DYE: CPT | Mod: 26,MG

## 2022-02-04 PROCEDURE — 99284 EMERGENCY DEPT VISIT MOD MDM: CPT | Mod: 25

## 2022-02-04 RX ORDER — ACETAMINOPHEN 500 MG
650 TABLET ORAL ONCE
Refills: 0 | Status: COMPLETED | OUTPATIENT
Start: 2022-02-04 | End: 2022-02-04

## 2022-02-04 RX ADMIN — Medication 650 MILLIGRAM(S): at 21:37

## 2022-02-04 NOTE — ED ADULT NURSE NOTE - NSIMPLEMENTINTERV_GEN_ALL_ED
Implemented All Fall Risk Interventions:  [place] to call system. Call bell, personal items and telephone within reach. Instruct patient to call for assistance. Room bathroom lighting operational. Non-slip footwear when patient is off stretcher. Physically safe environment: no spills, clutter or unnecessary equipment. Stretcher in lowest position, wheels locked, appropriate side rails in place. Provide visual cue, wrist band, yellow gown, etc. Monitor gait and stability. Monitor for mental status changes and reorient to person, place, and time. Review medications for side effects contributing to fall risk. Reinforce activity limits and safety measures with patient and family.

## 2022-02-04 NOTE — ED ADULT NURSE NOTE - OBJECTIVE STATEMENT
pt presented to the ED s/p fall while trying to catch the bus. pt fell backwards and hit her head. denies LOC. able to ambulate after fall. pt c/o R sided headache.  lungs clear bilaterally upon aucultation. no use of accessory muscles noted. Denies any chest pain or discomfort at this time.  pt ambulates in the ED with a steady gait. able to speak in full sentences and can follow commands.

## 2022-02-05 NOTE — ED PROVIDER NOTE - OBJECTIVE STATEMENT
50 y/o F with a pmhx of HTN presents to the ED with a cc of HA. Patient reports being on the way home after work trying to catch the bus and had slipped and fell. Bystanders were able to help her get up and patient was able to walk after but remembers being light headed and dizziness initially.  Some blurry vison but had resolved shortly after. Patient went home, at home she noticed pain in the area became way worse and decided to come to the ED. Patient is also c/o pain to the back of neck. Patient is appearing upset and crying in ED. 50 y/o F with a pmhx of HTN presents to the ED with a cc of HA. Patient reports being on the way home after work trying to catch the bus and had slipped and fell backwards striking head on ground. No loc, no n/v, no prodrome, no back injury, no neck injury. Bystanders were able to help her get up and patient was able to walk after but remembers feeling mild lightheadedness and dizziness. Some blurry vison but had resolved shortly after. Patient went home, at home she noticed pain in the area became way worse and decided to come to the ED. Patient is also c/o pain to the back of neck. Patient is appearing upset and crying in ED. No antiplatelet/AC. No etoh abuse.

## 2022-02-05 NOTE — ED PROVIDER NOTE - MUSCULOSKELETAL, MLM
Spine appears normal, range of motion is not limited, no muscle or joint tenderness. Tenderness to posterior skull with no lacerations, no large hematomas palpated.

## 2022-02-05 NOTE — ED PROVIDER NOTE - NSFOLLOWUPINSTRUCTIONS_ED_ALL_ED_FT
HEAD INJURY - General Information           Head Injury    WHAT YOU NEED TO KNOW:    What do I need to know about a head injury? A head injury can include your scalp, face, skull, or brain and range from mild to severe. Effects can appear immediately after the injury or develop later. The effects may last a short time or be permanent. Healthcare providers may want to check your recovery over time. Treatment may change as you recover or develop new health problems from the head injury.    What are the signs and symptoms of a head injury?   •An open scalp or skin wound, swelling, or bruising      •Mild to moderate headache      •Dizziness or loss of balance      •Nausea or vomiting      •Ringing in the ears or neck pain      •Confusion, especially right after the injury      •Change in mood, such as feeling restless or irritable      •Trouble thinking, remembering, or concentrating      •Drowsiness or decreased amount of energy      •Trouble sleeping      How is a head injury diagnosed?   •Tell your healthcare provider about your injury and symptoms. The provider will do an exam to check your brain function. He or she will check how your pupils react to light. He or she will check your memory, hand grasp, and balance.      •You may need x-rays, a CT scan, or an MRI to check for bleeding or major damage to your skull or brain. You may be given contrast liquid to help the pictures show up better. Tell the healthcare provider if you have ever had an allergic reaction to contrast liquid. Do not enter the MRI room with anything metal. Metal can cause serious injury. Tell the provider if you have any metal in or on your body.      How is a head injury treated? A mild head injury may not need to be treated. You may be given medicine to decrease pain. Other treatments may depend on how severe your head injury is. A concussion, hematoma (collection of blood), or traumatic brain injury may need both immediate and long-term treatment.    How can I manage my symptoms?   •Rest or do quiet activities. Limit your time watching TV, using the computer, or doing tasks that require a lot of thinking. Slowly return to your normal activities as directed. Do not play sports or do activities that may cause you to get hit in the head. Ask your healthcare provider when you can return to sports.      •Apply ice on your head for 15 to 20 minutes every hour or as directed. Use an ice pack, or put crushed ice in a plastic bag. Cover it with a towel before you apply it to your skin. Ice helps prevent tissue damage and decreases swelling and pain.      •Have someone stay with you for 24 hours , or as directed. This person can monitor you for problems and call for help if needed. When you are awake, the person should ask you a few questions every few hours to see if you are thinking clearly. An example is to ask your name or address.      What can I do to prevent another head injury?   •Wear a helmet that fits properly. Do this when you play sports, or ride a bike, scooter, or skateboard. Helmets help decrease your risk for a serious head injury. Talk to your healthcare provider about other ways you can protect yourself if you play sports.      •Wear your seatbelt every time you are in a car. This helps lower your risk for a head injury if you are in a car accident.      Call your local emergency number (911 in the ), or have someone else call if:   •You cannot be woken.      •You have a seizure.      •You stop responding to others or you faint.      •You have blurry or double vision.      •Your speech becomes slurred or confused.      •You have arm or leg weakness, loss of feeling, or new problems with coordination.      •Your pupils are larger than usual, or one pupil is a different size than the other.      •You have blood or clear fluid coming out of your ears or nose.      When should I seek immediate care?   •You have repeated or forceful vomiting.      •You feel confused.      •Your headache gets worse or becomes severe.      •You or someone caring for you notices that you are harder to wake than usual.      When should I call my doctor?   •Your symptoms last longer than 6 weeks after the injury.      •You have questions or concerns about your condition or care.      CARE AGREEMENT:    You have the right to help plan your care. Learn about your health condition and how it may be treated. Discuss treatment options with your healthcare providers to decide what care you want to receive. You always have the right to refuse treatment.                          CONCUSSION - General Information           Concussion    WHAT YOU NEED TO KNOW:    What is a concussion? A concussion is a mild brain injury. It is usually caused by a bump or blow to the head from a fall, a motor vehicle crash, or a sports injury. Being shaken forcefully may also cause a concussion.    What are the signs and symptoms of a concussion? Symptoms may occur right away, or they may appear days after the concussion:  •A mild to moderate headache      •Dizziness, loss of balance, or blurry vision      •Nausea or vomiting      •A change in mood, such as restlessness or irritability      •Trouble thinking, remembering things, or concentrating      •Ringing in the ears      •Drowsiness or decreased energy      •Changes in your normal sleeping pattern      How is a concussion diagnosed? Your healthcare provider will ask how you were injured, and about your symptoms. He or she will also examine you. You may need any of the following:   •A neurologic exam is also called neuro signs, neuro checks, or neuro status. A neurologic exam can show healthcare providers how well your brain works after your injury. Healthcare providers will check how your pupils react to light. They may check your memory and how easily you wake up. Your hand grasp and balance may also be tested.      •CT or MRI pictures may be taken of your head. You may be given contrast liquid to help the pictures show up better. Tell the healthcare provider if you have ever had an allergic reaction to contrast liquid. Do not enter the MRI room with anything metal. Metal can cause serious injury. Tell the healthcare provider if you have any metal in or on your body.      How is a concussion managed? Usually no treatment is needed for a mild concussion. Concussion symptoms usually go away within about 10 days, but they may last longer. The following may be recommended to manage your symptoms:   •Rest from physical and mental activities as directed. Mental activities are those that require thinking, concentration, and attention. You will need to rest until your symptoms are gone. Rest will allow you to recover from your concussion. Ask your healthcare provider when you can return to work and other daily activities.      •Have someone stay with you for the first 24 hours after your injury. Your healthcare provider should be contacted if your symptoms get worse, or you develop new symptoms.      •Do not participate in sports and physical activities until your healthcare provider says it is okay. They could make your symptoms worse or lead to another concussion. Your healthcare provider will tell you when it is okay for you to return to sports or physical activities. Ask for more information about sports concussions.      •Acetaminophen decreases pain and fever. It is available without a doctor's order. Ask how much to take and how often to take it. Follow directions. Read the labels of all other medicines you are using to see if they also contain acetaminophen, or ask your doctor or pharmacist. Acetaminophen can cause liver damage if not taken correctly. Do not use more than 4 grams (4,000 milligrams) total of acetaminophen in one day.       •NSAIDs help decrease swelling and pain or fever. This medicine is available with or without a doctor's order. NSAIDs can cause stomach bleeding or kidney problems in certain people. If you take blood thinner medicine, always ask your healthcare provider if NSAIDs are safe for you. Always read the medicine label and follow directions.      How can I help prevent another concussion?   •Wear protective sports equipment that fits properly. Helmets help decrease your risk for a serious brain injury. Talk to your healthcare provider about ways you can decrease your risk for a concussion if you play sports.      •Wear your seatbelt every time you travel. This helps to decrease your risk for a head injury if you are in a car accident.       Have someone call 911 for any of the following:   •You cannot be woken.      •You have a seizure, increasing confusion, or a change in personality.      •Your speech becomes slurred.      When should I seek immediate care?   •You have sudden and new vision problems.      •You have a severe headache that does not go away.      •You have arm or leg weakness, numbness, or new problems with coordination.      •You have blood or clear fluid coming out of the ears or nose.      When should I contact my healthcare provider?   •You have nausea or are vomiting.      •You feel more sleepy than usual.      •Your symptoms get worse.      •Your symptoms last longer than 6 weeks after the injury.      •You have questions or concerns about your condition or care.      CARE AGREEMENT:    You have the right to help plan your care. Learn about your health condition and how it may be treated. Discuss treatment options with your healthcare providers to decide what care you want to receive. You always have the right to refuse treatment.

## 2022-02-05 NOTE — ED PROVIDER NOTE - PATIENT PORTAL LINK FT
You can access the FollowMyHealth Patient Portal offered by [facility] by registering at the following website: [email or web address]. By joining [employer]’s FollowMyHealth portal, you will also be able to view your health information using other applications (apps) compatible with our system.

## 2022-02-05 NOTE — ED PROVIDER NOTE - CONSTITUTIONAL, MLM
normal... Appearing upset and crying, awake, alert, oriented to person, place, time/situation and in no apparent distress.

## 2022-02-05 NOTE — ED PROVIDER NOTE - NSICDXPASTMEDICALHX_GEN_ALL_CORE_FT
PAST MEDICAL HISTORY:  No pertinent past medical history      PAST MEDICAL HISTORY:  HTN (hypertension)

## 2022-02-05 NOTE — ED PROVIDER NOTE - PROGRESS NOTE DETAILS
pt feels better, results discussed and she is seeking to be d/c home pt feels better, ambulating at baseline, results discussed and she is seeking to be d/c home

## 2022-02-05 NOTE — ED PROVIDER NOTE - CLINICAL SUMMARY MEDICAL DECISION MAKING FREE TEXT BOX
51 f with a pmhx of htn presents to the ED s/p mechanical fall on street. Patient is ambulatory without any focal deficits. Will check head and neck ct, address patients pain and anticipate discharge home if no acute abnormal findings.

## 2022-05-03 ENCOUNTER — EMERGENCY (EMERGENCY)
Facility: HOSPITAL | Age: 52
LOS: 1 days | Discharge: HOME | End: 2022-05-03
Attending: EMERGENCY MEDICINE | Admitting: EMERGENCY MEDICINE
Payer: COMMERCIAL

## 2022-05-03 VITALS
WEIGHT: 190.04 LBS | HEART RATE: 92 BPM | DIASTOLIC BLOOD PRESSURE: 77 MMHG | OXYGEN SATURATION: 98 % | SYSTOLIC BLOOD PRESSURE: 132 MMHG | HEIGHT: 63 IN | RESPIRATION RATE: 17 BRPM | TEMPERATURE: 98 F

## 2022-05-03 DIAGNOSIS — M25.561 PAIN IN RIGHT KNEE: ICD-10-CM

## 2022-05-03 DIAGNOSIS — G89.29 OTHER CHRONIC PAIN: ICD-10-CM

## 2022-05-03 DIAGNOSIS — E78.5 HYPERLIPIDEMIA, UNSPECIFIED: ICD-10-CM

## 2022-05-03 DIAGNOSIS — M25.562 PAIN IN LEFT KNEE: ICD-10-CM

## 2022-05-03 DIAGNOSIS — Z91.013 ALLERGY TO SEAFOOD: ICD-10-CM

## 2022-05-03 PROCEDURE — 99283 EMERGENCY DEPT VISIT LOW MDM: CPT

## 2022-05-03 RX ORDER — IBUPROFEN 200 MG
600 TABLET ORAL ONCE
Refills: 0 | Status: COMPLETED | OUTPATIENT
Start: 2022-05-03 | End: 2022-05-03

## 2022-05-03 RX ORDER — IBUPROFEN 200 MG
1 TABLET ORAL
Qty: 30 | Refills: 0
Start: 2022-05-03

## 2022-05-03 RX ORDER — TRAMADOL HYDROCHLORIDE 50 MG/1
1 TABLET ORAL
Qty: 12 | Refills: 0
Start: 2022-05-03 | End: 2022-05-05

## 2022-05-03 RX ADMIN — Medication 600 MILLIGRAM(S): at 18:54

## 2022-05-03 NOTE — ED PROVIDER NOTE - PATIENT PORTAL LINK FT
You can access the FollowMyHealth Patient Portal offered by [facility] by registering at the following website: [email or web address]. By joining [employer]’s FollowMyHealth portal, you will also be able to view your health information using other applications (apps) compatible with our system.

## 2022-05-03 NOTE — ED PROVIDER NOTE - OBJECTIVE STATEMENT
Pt w/ PMHx HLD p/w b/l knee pain for months. Pt was taking Naprosyn for pain but is no longer doing so  Pt had 40lb weight gain in the past several months. Pt w/ PMHx HLD p/w b/l knee pain for months. Pt was taking Naprosyn for pain but is no longer doing so  Pt had 40lb weight gain in the past several months.  She has seen ortho previously, has been referred to PT  No new trauma

## 2022-05-03 NOTE — ED PROVIDER NOTE - NS ED ROS FT
MS: See HPI  Neuro: No headache or weakness. No LOC.  Skin: No skin rash.   Except as documented in the HPI, all other systems are negative.

## 2022-05-03 NOTE — ED ADULT NURSE NOTE - OBJECTIVE STATEMENT
56 y/o female c/o worsening chronic bilateral knee pain. stated " her pain is getting so bad, not relief by naproxen"

## 2022-05-03 NOTE — ED PROVIDER NOTE - NSFOLLOWUPINSTRUCTIONS_ED_ALL_ED_FT
Take the prescribed medications for your pain. Weight loss would help decrease pressure on the joints and pain. Follow up with your orthopedist.     [facility]  [phone]  [address] [place], [place] [address]  Thursdays 12pm - 4pm     Chronic Knee Pain, Adult      Chronic knee pain is pain in one or both knees that lasts longer than 3 months. Symptoms of chronic knee pain may include swelling, stiffness, and discomfort. Age-related wear and tear (osteoarthritis) of the knee joint is the most common cause of chronic knee pain. Other possible causes include:  •A long-term immune-related disease that causes inflammation of the knee (rheumatoid arthritis). This usually affects both knees.      •Inflammatory arthritis, such as gout or pseudogout.      •An injury to the knee that causes arthritis.      •An injury to the knee that damages the ligaments. Ligaments are strong tissues that connect bones to each other.      •Runner's knee or pain behind the kneecap.      Treatment for chronic knee pain depends on the cause. The main treatments for chronic knee pain are physical therapy and weight loss. This condition may also be treated with medicines, injections, a knee sleeve or brace, and by using crutches. Rest, ice, pressure (compression), and elevation, also known as RICE therapy, may also be recommended.      Follow these instructions at home:      If you have a knee sleeve or brace:      •Wear the knee sleeve or brace as told by your health care provider. Remove it only as told by your health care provider.      •Loosen it if your toes tingle, become numb, or turn cold and blue.      •Keep it clean.    •If the sleeve or brace is not waterproof:  •Do not let it get wet.       •Remove it if allowed by your health care provider, or cover it with a watertight covering when you take a bath or a shower.          Managing pain, stiffness, and swelling                 •If directed, apply heat to the affected area as often as told by your health care provider. Use the heat source that your health care provider recommends, such as a moist heat pack or a heating pad.  •If you have a removable knee sleeve or brace, remove it as told by your health care provider.      •Place a towel between your skin and the heat source.      •Leave the heat on for 20–30 minutes.      •Remove the heat if your skin turns bright red. This is especially important if you are unable to feel pain, heat, or cold. You may have a greater risk of getting burned.      •If directed, put ice on the affected area. To do this:  •If you have a removable knee sleeve or brace, remove it as told by your health care provider.      •Put ice in a plastic bag.      •Place a towel between your skin and the bag.      •Leave the ice on for 20 minutes, 2–3 times a day.      •Remove the ice if your skin turns bright red. This is very important. If you cannot feel pain, heat, or cold, you have a greater risk of damage to the area.        •Move your toes often to reduce stiffness and swelling.      •Raise (elevate) the injured area above the level of your heart while you are sitting or lying down.      Activity     •Avoid high-impact activities or exercises, such as running, jumping rope, or doing jumping jacks.    •Follow the exercise plan that your health care provider designed for you. Your health care provider may suggest that you:  •Avoid activities that make knee pain worse. This may require you to change your exercise routines, sport participation, or job duties.      •Wear shoes with cushioned soles.      •Avoid sports that require running and sudden changes in direction.      •Do physical therapy. Physical therapy is planned to match your needs and abilities. It may include exercises for strength, flexibility, stability, and endurance.      •Do exercises that increase balance and strength, such as tiff chi and yoga.        • Do not use the injured limb to support your body weight until your health care provider says that you can. Use crutches as told by your health care provider.      •Return to your normal activities as told by your health care provider. Ask your health care provider what activities are safe for you.      General instructions     •Take over-the-counter and prescription medicines only as told by your health care provider.      •Lose weight if you are overweight. Losing even a little weight can reduce knee pain. Ask your health care provider what your ideal weight is, and how to safely lose extra weight. A dietitian may be able to help you plan your meals.      • Do not use any products that contain nicotine or tobacco, such as cigarettes, e-cigarettes, and chewing tobacco. These can delay healing. If you need help quitting, ask your health care provider.      •Keep all follow-up visits. This is important.        Contact a health care provider if:    •You have knee pain that is not getting better or gets worse.      •You are unable to do your physical therapy exercises due to knee pain.        Get help right away if:    •Your knee swells and the swelling becomes worse.      •You cannot move your knee.      •You have severe knee pain.        Summary    •Knee pain that lasts more than 3 months is considered chronic knee pain.      •The main treatments for chronic knee pain are physical therapy and weight loss. You may also need to take medicines, wear a knee sleeve or brace, use crutches, and apply ice or heat.      •Losing even a little weight can reduce knee pain. Ask your health care provider what your ideal weight is, and how to safely lose extra weight. A dietitian may be able to help you plan your meals.      •Follow the exercise plan that your health care provider designed for you.      This information is not intended to replace advice given to you by your health care provider. Make sure you discuss any questions you have with your health care provider.

## 2022-05-03 NOTE — ED PROVIDER NOTE - PHYSICAL EXAMINATION
Limited PE performed in the setting of the COVID10 pandemic, in efforts to limit exposure and cross-contamination  Constitutional: Well appearing, awake, alert, oriented to person, place, time/situation and in no apparent distress.  ENMT: Airway patent.   Eyes: Clear bilaterally  Cardiac: Normal rate, regular rhythm.   Respiratory: No increased WOB, tachypnea, hypoxia, or accessory mm use. Pt speaks in full sentences.   Musculoskeletal: Range of motion is not limited in b/l LE. no crepitus or instability. neg Lachman's. no swelling or warmth. b/l LE symmetric in size. no calf ttp  Vasc: 2+ pedal pulses b/l  Neuro: Alert and oriented x 3, face symmetric and speech fluent. Nml gross motor movement, grossly non focal   Skin: Skin normal color for race, warm, dry and intact. No evidence of rash.  Psych: Alert and oriented to person, place, time/situation. normal mood and affect. no apparent risk to self or others.

## 2022-05-03 NOTE — ED PROVIDER NOTE - CLINICAL SUMMARY MEDICAL DECISION MAKING FREE TEXT BOX
Chronic knee pain w/o new injuries or findings to necessitate imaging. No findings on exam. NVI. Analgesia, f/u ortho

## 2022-05-03 NOTE — ED ADULT TRIAGE NOTE - CHIEF COMPLAINT QUOTE
Pt reports chronic knee pain to bilateral knees "for a while." Hx of meniscus tear and sx to left knee. Underwent PT last year, "but the pain is getting bad again." Pt is able to ambulate with a cane. Denies injuries/falls.

## 2022-05-04 PROBLEM — I10 ESSENTIAL (PRIMARY) HYPERTENSION: Chronic | Status: ACTIVE | Noted: 2022-02-05

## 2022-10-17 NOTE — ED PROVIDER NOTE - CCCP TRG CHIEF CMPLNT
Spoke with patient and explained she has everything completed from urology that was ordered  The labs that are not completed were not ordered by urology  Patient was advised to contact the office who did order those labs  Patient states she has an appt on 10/20/22 with ALESSANDRA Velasquez and is wondering if there is any chance she could do virtual visit  She saw her results on mychart and although she understands she is not a provider, she didn't feel it was absolutely necessary for her to come all the way from [place] for a verbal explanation of her urine and US results  Please advise if virtual visit is appropriate  abdominal pain

## 2022-11-16 NOTE — ED PROVIDER NOTE - CARE PROVIDERS DIRECT ADDRESSES
Patient mother calling back to follow up on surgery. States she was told to schedule surgery for 11/21.  Please advise ,DirectAddress_Unknown,DirectAddress_Unknown

## 2023-01-12 ENCOUNTER — EMERGENCY (EMERGENCY)
Facility: HOSPITAL | Age: 53
LOS: 1 days | Discharge: HOME | End: 2023-01-12
Admitting: EMERGENCY MEDICINE
Payer: COMMERCIAL

## 2023-01-12 VITALS
DIASTOLIC BLOOD PRESSURE: 78 MMHG | TEMPERATURE: 98 F | OXYGEN SATURATION: 98 % | RESPIRATION RATE: 18 BRPM | SYSTOLIC BLOOD PRESSURE: 123 MMHG | HEART RATE: 71 BPM

## 2023-01-12 VITALS
OXYGEN SATURATION: 97 % | DIASTOLIC BLOOD PRESSURE: 81 MMHG | TEMPERATURE: 98 F | HEART RATE: 80 BPM | RESPIRATION RATE: 18 BRPM | SYSTOLIC BLOOD PRESSURE: 134 MMHG

## 2023-01-12 PROBLEM — M54.30 SCIATICA, UNSPECIFIED SIDE: Chronic | Status: ACTIVE | Noted: 2022-05-03

## 2023-01-12 PROBLEM — M54.9 DORSALGIA, UNSPECIFIED: Chronic | Status: ACTIVE | Noted: 2022-05-03

## 2023-01-12 LAB
FLUAV AG NPH QL: SIGNIFICANT CHANGE UP
FLUBV AG NPH QL: SIGNIFICANT CHANGE UP
RSV RNA NPH QL NAA+NON-PROBE: SIGNIFICANT CHANGE UP
SARS-COV-2 RNA SPEC QL NAA+PROBE: SIGNIFICANT CHANGE UP

## 2023-01-12 PROCEDURE — 71046 X-RAY EXAM CHEST 2 VIEWS: CPT | Mod: 26

## 2023-01-12 PROCEDURE — 71046 X-RAY EXAM CHEST 2 VIEWS: CPT

## 2023-01-12 PROCEDURE — 99283 EMERGENCY DEPT VISIT LOW MDM: CPT | Mod: 25

## 2023-01-12 PROCEDURE — 99284 EMERGENCY DEPT VISIT MOD MDM: CPT

## 2023-01-12 PROCEDURE — 87637 SARSCOV2&INF A&B&RSV AMP PRB: CPT

## 2023-01-12 RX ORDER — BROMPHENIRAMINE MALEATE, PSEUDOEPHEDRINE HYDROCHLORIDE, AND DEXTROMETHORPHAN HYDROBROMIDE 2; 10; 30 MG/5ML; MG/5ML; MG/5ML
10 SOLUTION ORAL
Qty: 280 | Refills: 0
Start: 2023-01-12 | End: 2023-01-18

## 2023-01-12 NOTE — ED ADULT NURSE NOTE - NS ED NURSE DISCH DISPOSITION
Patient:   LINSEY YU            MRN: [id number]            FIN: [id number]              Age:   67 years     Sex:  MALE     :  52   Associated Diagnoses:   None   Author:   DAYDAY MICHELE     Subjective:  Patient has been extubated this morning to nasal cannula  Alert and oriented x3  Afebrile  Objective:  I & O between:  22-SEP-2019 13:38 TO 23-SEP-2019 13:38  Med Dosing Weight:  67.7  kg   22-SEP-2019  24 Hour Intake:   2621.55  ( 38.72 mL/kg )  24 Hour Output:   1775.00           24 Hour Urine/Stool Output:   0.0  24 Hour Balance:   846.55           24 Hour Urine Output:   1775.00  ( 1.09 mL/kg/hr )                    Stool Count:  3.00     Vitals between:   22-SEP-2019 13:38:21   TO   23-SEP-2019 13:38:21                   LAST RESULT MINIMUM MAXIMUM  Temperature 36.7 36.5 36.7  Heart Rate 102 102 169  Respiratory Rate 23 10 40  NISBP           108 84 200  NIDBP           76 61 148  NIMBP           88 68 163  SpO2                    100 95 100  FiO2                    0.4 0.4 1.0  Medications (18) Active  Scheduled: (12)  Albuterol-ipratropium 2.5-0.5 mg/3 mL nebulizer soln  3 mL, Nebulizer, Q6H  Aspirin 81 mg chew tab  81 mg 1 tab, Oral, Daily  azithromycin  500 mg, IVPB, Daily  Azithromycin 250 mg tab  250 mg 1 tab, Oral, Q24H  cefTRIAXone  2,000 mg 20 mL, Slow IV Push, Daily  Folic acid 1 mg tab  1 mg 1 tab, Oral, Daily  Gabapentin 300 mg cap  300 mg 1 cap, Oral, Q8H  Heparin 5,000 unit/1 mL inj  5,000 unit 1 mL, Subcutaneous, Q8H  Multivitamin-minerals therapeutic tab  1 tab, Oral, Daily  potassium phosphate-sodium phosphate  1 packet, Oral, Once (scheduled)  Senna-docusate sodium 8.6-50 mg tab  2 tab, OG-tube, Q Bedtime  Thiamine 100 mg tab  100 mg 1 tab, Oral, Daily  Continuous: (2)  AMIODArone 450 mg [0.5 mg/min] + Dextrose 5% 250 mL  250 mL, IV, 16.67 mL/hr  Lactated Ringers 1,000 mL  1,000 mL, IV, 100 mL/hr  PRN: (4)  Albuterol-ipratropium 2.5-0.5 mg/3 mL nebulizer soln  3 mL,  Nebulizer, Q6H  LORazepam 2 mg/1 mL inj SDV  2 mg 1 mL, Slow IV Push, Q1H  LORazepam 2 mg/1 mL inj SDV  3 mg 1.5 mL, Slow IV Push, Q1H  LORazepam 2 mg/1 mL inj SDV  4 mg 2 mL, Slow IV Push, Q1H  GENERAL:  in no apparent distress, ANO x3  CHEST: CTA B  CARDIAC: S1 and S2, without murmurs, gallops, or rubs.  VASCULAR:  No Edema.  Peripheral pulses normal and equal in all extremities.  ABDOMEN:  Soft, without detectable tenderness.   Bowel Sounds normal.  MUSCULOSKELETAL:  Good range of motion of all major joints. Extremities without clubbing, cyanosis or edema.  PSYCH: Normal moood and affect.  Labs:  Labs between:  22-SEP-2019 13:38 to 23-SEP-2019 13:38  CBC:                 WBC  HgB  Hct  Plt  MCV  RDW   23-SEP-2019 5.3  13.7  40.8  (L) 72  (L) 73.6  (H) 18.2   22-SEP-2019 4.4  13.9  41.5  (L) 71  (L) 74.2  (H) 17.3   DIFF:                 Seg  Neutroph//ABS  Lymph//ABS  Mono//ABS  EOS/ABS  23-SEP-2019 54  // 3.4 // 1.3 // 0.5 // (L) 0.0   22-SEP-2019 34  // 3.3 // (L) 0.9  // 0.3 // (L) 0.0   BMP:                 Na  Cl  BUN  Glu   23-SEP-2019 138  98  17  (H) 100                              K  CO2  Cr  Ca                              (L) 3.0  27  0.99  (H) 12.1   BMP:                 Na  Cl  BUN  Glu   22-SEP-2019 140  99  16  (H) 142                              K  CO2  Cr  Ca                              (L) 2.9  27  0.98  (H) 12.2   CMP:                 AST  ALT  AlkPhos  Bili  Albumin   23-SEP-2019 (H) 62  47  75  (H) 2.0  (L) 2.9   22-SEP-2019 (H) 74  50  83  (H) 1.5  (L) 2.9   Other Chem:             Mg  Phos  Triglycerides  GGTP  DirectBili                           (L) 1.5  (L) 1.2         POC GLU:                 Latest Result  Latest Date  Minimum  Min Date  Maximum  Max Date                             (H) 109  23-SEP-2019 (H) 109  23-SEP-2019 (H) 145  22-SEP-2019  Blood Gas:            Ph  PCO2  PO2  BiCarb  BaseExcess   Arterial:  23-SEP-2019 (H) 7.52  35  102  (H) 29  (H) 6                               Ionized Ca  Na  K  HgB  Lactic Acid                              (H) 1.64  (L) 134  (L) 3.2  (L) 12.3  (!) 2.6  22-SEP-2019 (L) 7.32  (H) 57  (H) 171  (H) 29  2                              Ionized Ca  Na  K  HgB  Lactic Acid                                      (!) 3.8   22-SEP-2019 (L) 7.33  (H) 56  (H) 255  (H) 29  2                              Ionized Ca  Na  K  HgB  Lactic Acid                              (H) 1.81  (L) 134  (!) 2.6  14.8  (!) 5.8                  Legionella negative  Strep pneumo negative  Blood cultures pending  Sputum cultures pending  MRSA nares negative  2D echo  UMMARY:  1. Left ventricle: The cavity size is at the upper limits of     normal. Wall thickness is normal. Systolic function is mildly     reduced. The estimated ejection fraction is 35-40%, by visual     assessment. Hypokinesis of the anteroseptal myocardium.     Hypokinesis of the anterolateral myocardium.  2. Regional wall motion abnormalities: Akinesis of the basal-mid     anteroseptal myocardium; hypokinesis of the basal-mid     inferoseptal and basal-mid inferior myocardium.  CT chest  Ascending thoracic aorta measures 4.4 cm.  This is at the upper limits of normal in size.  The transverse and descending thoracic aorta are unremarkable.  Fluid is present throughout the esophagus.  No discrete mass identified.  The heart and great vessels are unremarkable.  No hilar or mediastinal lymphadenopathy.  No pericardial or pleural effusion.  There are airspace opacities within the bases of both lungs in a widespread distribution.  These may be inflammatory or infectious.  The upper lobes are spared.    Chest x-ray 9/23/2019–persistent bilateral basilar opacities, ET tube removed  Assessment and plan:   Patient is 67-year-old malewith past medical history of HNT and possible drug use who presented to emergency room after mechanical fall with closed head injury to the back and wide-complex tachycardia.  Was intubated in ED for  resp distress and hypoxemia.  #Syncope unclear etiology  #ARF with hypoxemia improved  #abnormal imaging with b/l infiltrates  #Pneumonia  #drug use  #abnormal ABG  #lactic acidosis  #ARF unknown baseline  #HTN  Plan:  - Syncope etiology remains not absolutely clear  - In a setting of elevated troponin which does suggest more of a demand ischemia but also reduced ejection fraction and focal myocardial areas of dysfunction cardiac etiology is mostly likely caused the patient's presenting symptoms  - Discussed with cardiology, plan for cardiac cath tomorrow  - PE ruled out with CTA  - echo as above  - CT chest with b/l infiltrates favoring infectious process, lower lobes predominant, multilobar. Pneumonitis also possible.  - cultures pending  - Urine strep, Legionella negative.  Infiltrates would be more consistent with Legionella and also Legionella testing does not include all Legionella strains so it does not exclude the possibility of Legionella pneumonia.  - Continue community-acquired pneumonia antibiotic coverage.  Will continue for 5 to 7 days  - Follow-up cultures  - ABG reviewed  - Lactic acidosis resolved  - Fluid resuscited yesterday  - keep MAP>65  - Troponin downtrending, no further follow-up.  Peaked at 0.2  - Cardiac cath tomorrow, n.p.o. after midnight  - Continue aspirin  - Cardiology following, appreciate recs  - RUDS neg, patient denying substance or alcohol use.  His significant other states that patient is a heavy drinker  - We will initiate CIWA protocol  - Start Neurontin for EtOH withdrawal prevention  - Amiodarone drip for amnio loading for arrhythmia  - I suspect initial presentation was A. fib with aberrancy  - decision to convert to p.o. amiodarone per cardiology  - Creatinine improved with IV hydration  - monitor UO, IVF  - Hypercalcemia improved with hydration although still elevated  - PTH, vitamin D level pending  - Electrolyte replacement as needed  - PPx: [facility]  [facility]  cardiology, RN  Critical care time spent with this patient was 35 minutes excluding teaching or proceduresperformed.    Tl Pruitt MD  Pulmonary and critical care medicine  [facility], [facility] and sleep physicians   Discharged

## 2023-01-12 NOTE — ED PROVIDER NOTE - CLINICAL SUMMARY MEDICAL DECISION MAKING FREE TEXT BOX
53 yo female with h/o HLD, seasonal allergy, in the ER c/o persistent heavy cough. Pt states she is fully vaccinated for covid and flu. Mentioned she was treated with nebulizer, steroids and even a course of abx for this cough. Pt states she was feeling better for a few days but since yesterday cough is worse again. Pt denies hemoptysis, night sweats, SOB, recent travel, smoking.  pt is non-toxic appearing, has no wheezing, no rales. plan to check for covid/flu, cxr, anticipate d/c home for out pt care.

## 2023-01-12 NOTE — ED ADULT NURSE NOTE - CHPI ED NUR SYMPTOMS NEG
no chills/no diaphoresis/no edema/no fever/no headache/no hemoptysis/no shortness of breath/no wheezing

## 2023-01-12 NOTE — ED PROVIDER NOTE - PATIENT PORTAL LINK FT
You can access the FollowMyHealth Patient Portal offered by [facility] by registering at the following website: [email or web address]. By joining [facility]’s FollowMyHealth portal, you will also be able to view your health information using other applications (apps) compatible with our system.

## 2023-01-12 NOTE — ED PROVIDER NOTE - OBJECTIVE STATEMENT
53 yo female with h/o HLD, seasonal allergy, in the ER c/o persistent heavy cough. Pt states she is fully vaccinated for covid and flu. Mentioned she was treated with nebulizer, steroids and even a course of abx for this cough. Pt states she was feeling better for a few days but since yesterday cough is worse again. Pt denies hemoptysis, night sweats, SOB, recent travel, smoking.

## 2023-01-12 NOTE — ED ADULT NURSE NOTE - OBJECTIVE STATEMENT
Patient w/ PMHx HLD, seasonal allergy; c/p coughing for about a month now. Patient and UC visits and gotten neb, steroids, and ABX for the same problem. Vaccine up to date. Patient denies sob. No respiratory distress noted, no wheezing.

## 2023-01-12 NOTE — ED ADULT TRIAGE NOTE - ARRIVAL INFO ADDITIONAL COMMENTS
pt. states she has had cough on/off for about 6 weeks. finished azithromycin and steroids 2 days ago.

## 2023-01-15 DIAGNOSIS — R05.8 OTHER SPECIFIED COUGH: ICD-10-CM

## 2023-01-15 DIAGNOSIS — Z87.39 PERSONAL HISTORY OF OTHER DISEASES OF THE MUSCULOSKELETAL SYSTEM AND CONNECTIVE TISSUE: ICD-10-CM

## 2023-01-15 DIAGNOSIS — J30.2 OTHER SEASONAL ALLERGIC RHINITIS: ICD-10-CM

## 2023-01-15 DIAGNOSIS — Z91.018 ALLERGY TO OTHER FOODS: ICD-10-CM

## 2023-01-15 DIAGNOSIS — Z91.013 ALLERGY TO SEAFOOD: ICD-10-CM

## 2023-01-15 DIAGNOSIS — Z20.822 CONTACT WITH AND (SUSPECTED) EXPOSURE TO COVID-19: ICD-10-CM

## 2023-01-15 DIAGNOSIS — E78.5 HYPERLIPIDEMIA, UNSPECIFIED: ICD-10-CM

## 2023-02-01 ENCOUNTER — EMERGENCY (EMERGENCY)
Facility: HOSPITAL | Age: 53
LOS: 1 days | Discharge: HOME | End: 2023-02-01
Attending: EMERGENCY MEDICINE | Admitting: EMERGENCY MEDICINE
Payer: COMMERCIAL

## 2023-02-01 VITALS
OXYGEN SATURATION: 97 % | WEIGHT: 184.97 LBS | SYSTOLIC BLOOD PRESSURE: 116 MMHG | DIASTOLIC BLOOD PRESSURE: 77 MMHG | RESPIRATION RATE: 16 BRPM | TEMPERATURE: 98 F | HEIGHT: 63 IN | HEART RATE: 82 BPM
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
## 2023-02-01 DIAGNOSIS — M25.562 PAIN IN LEFT KNEE: ICD-10-CM

## 2023-02-01 DIAGNOSIS — M25.572 PAIN IN LEFT ANKLE AND JOINTS OF LEFT FOOT: ICD-10-CM

## 2023-02-01 DIAGNOSIS — M54.9 DORSALGIA, UNSPECIFIED: ICD-10-CM

## 2023-02-01 DIAGNOSIS — E78.5 HYPERLIPIDEMIA, UNSPECIFIED: ICD-10-CM
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
## 2023-02-01 DIAGNOSIS — Z91.013 ALLERGY TO SEAFOOD: ICD-10-CM

## 2023-02-01 DIAGNOSIS — Z91.018 ALLERGY TO OTHER FOODS: ICD-10-CM

## 2023-02-01 PROCEDURE — 73610 X-RAY EXAM OF ANKLE: CPT | Mod: 26,LT

## 2023-02-01 PROCEDURE — 73630 X-RAY EXAM OF FOOT: CPT | Mod: 26,LT

## 2023-02-01 PROCEDURE — 99284 EMERGENCY DEPT VISIT MOD MDM: CPT | Mod: 25

## 2023-02-01 PROCEDURE — 99285 EMERGENCY DEPT VISIT HI MDM: CPT

## 2023-02-01 PROCEDURE — 73610 X-RAY EXAM OF ANKLE: CPT

## 2023-02-01 PROCEDURE — 73700 CT LOWER EXTREMITY W/O DYE: CPT | Mod: 26,LT,MA

## 2023-02-01 PROCEDURE — 73700 CT LOWER EXTREMITY W/O DYE: CPT | Mod: MA

## 2023-02-01 PROCEDURE — 73630 X-RAY EXAM OF FOOT: CPT

## 2023-02-01 RX ORDER — ACETAMINOPHEN 500 MG
650 TABLET ORAL ONCE
Refills: 0 | Status: COMPLETED | OUTPATIENT
Start: 2023-02-01 | End: 2023-02-01

## 2023-02-01 RX ADMIN — Medication 650 MILLIGRAM(S): at 22:58

## 2023-02-01 NOTE — ED PROVIDER NOTE - NSFOLLOWUPINSTRUCTIONS_ED_ALL_ED_FT
Can take tylenol 650mg every 6hrs as needed for pain.    Can also take motrin 600mg every 6hrs as needed for pain (take with food, use may cause stomach issues).    Follow up with primary doctor within 1-2 days.    Follow up with orthopedist. Can call [phone] to schedule appointment.     Return for fevers, persistent vomit, uncontrolled pain, focal weakness/numbness, worsening swelling, spreading redness.    Joint Pain    Joint pain (arthralgia) may be caused by many things. Joint pain is likely to go away when you follow instructions from your health care provider for relieving pain at home. However, joint pain can also be caused by conditions that require more treatment. Common causes of joint pain include:  Bruising in the area of the joint.  Injury caused by repeating certain movements too many times (overuse injury).  Age-related joint wear and tear (osteoarthritis).  Buildup of uric acid crystals in the joint (gout).  Inflammation of the joint (rheumatic disease).  Various other forms of arthritis.  Infections of the joint (septic arthritis) or of the bone (osteomyelitis).    Your health care provider may recommend that you take pain medicine or wear a supportive device like an elastic bandage, sling, or splint. If your joint pain continues, you may need lab or imaging tests to diagnose the cause of your joint pain.    Follow these instructions at home:  Managing pain, stiffness, and swelling   If directed, put ice on the painful area. Icing can help to relieve joint pain and swelling.  Put ice in a plastic bag.  Place a towel between your skin and the bag.  Leave the ice on for 20 minutes, 2–3 times a day.  If directed, apply heat to the painful area as often as told by your health care provider. Heat can reduce the stiffness of your muscles and joints. Use the heat source that your health care provider recommends, such as a moist heat pack or a heating pad.  Place a towel between your skin and the heat source.  Leave the heat on for 20–30 minutes.  Remove the heat if your skin turns bright red. This is especially important if you are unable to feel pain, heat, or cold. You may have a greater risk of getting burned.  Move your fingers or toes below the painful joint often. You can avoid stiffness and lessen swelling by doing this.  If possible, raise (elevate) the painful joint above the level of your heart while you are sitting or lying down. To do this, try putting a few pillows under the painful joint.    Activity   Rest the painful joint for as long as directed. Do not do anything that causes or worsens pain.  Begin exercising or stretching the affected area, as told by your health care provider. Ask your health care provider what types of exercise are safe for you.    If you have an elastic bandage, sling, or splint:   Wear the supportive device as told by your health care provider. Remove it only as told by your health care provider.  Loosen the device if your fingers or toes below the joint tingle, become numb, or turn cold and blue.  Keep the device clean.   Ask your health care provider if you should remove the device before bathing. You may need to cover it with a watertight covering when you take a bath or a shower.    General instructions   Take over-the-counter and prescription medicines only as told by your health care provider.  Do not use any products that contain nicotine or tobacco, such as cigarettes and e-cigarettes. If you need help quitting, ask your health care provider.  Keep all follow-up visits as told by your health care provider. This is important.  Contact a health care provider if:  You have pain that gets worse and does not get better with medicine.  Your joint pain does not improve within 3 days.  You have increased bruising or swelling.  You have a fever.  You lose 10 lb (4.5 kg) or more without trying.    Get help right away if:  You cannot move the joint.  Your fingers or toes tingle, become numb, or turn cold and blue.  You have a fever along with a joint that is red, warm, and swollen.    Summary  Joint pain (arthralgia) may be caused by many things.  Your health care provider may recommend that you take pain medicine or wear a supportive device like an elastic bandage, sling, or splint.  If your joint pain continues, you may need tests to diagnose the cause of your joint pain.  Take over-the-counter and prescription medicines only as told by your health care provider.

## 2023-02-01 NOTE — ED PROVIDER NOTE - CARE PROVIDER_API CALL
Jason Aguillon)  Orthopaedic Surgery; Sports Medicine  [address] [place] [address]  Phone: [phone]  Fax: ()-  Follow Up Time:    Jason Aguillon)  Orthopaedic Surgery; Sports Medicine  [address] [place] [address]  Phone: [phone]  Fax: ()-  Follow Up Time:     Beka Roy)  Podiatric Medicine and Surgery  [address] [facility], [address]  [place] [address]  Phone: [phone]  Fax: [phone]  Follow Up Time:

## 2023-02-01 NOTE — ED PROVIDER NOTE - PATIENT PORTAL LINK FT
You can access the FollowMyHealth Patient Portal offered by [facility] by registering at the following website: [email or web address]. By joining [facility]’s FollowMyHealth portal, you will also be able to view your health information using other applications (apps) compatible with our system.

## 2023-02-01 NOTE — ED PROVIDER NOTE - PROGRESS NOTE DETAILS
[place]: XR w/ no acute pathology. Given impact injury (though not high impact) and poor sensitivity of talar fx on XR, will get CT. Updated pt. no fracture on CT, placed in cam walker boot, given crutches. f/u with podiatry  I have discussed the discharge plan with the patient. The patient agrees with the plan, as discussed.  The patient understands Emergency Department diagnosis is a preliminary diagnosis often based on limited information and that the patient must adhere to the follow-up plan as discussed.  The patient understands that if the symptoms worsen the patient may return to the Emergency Department at any time for further evaluation and treatment.

## 2023-02-01 NOTE — ED PROVIDER NOTE - PHYSICAL EXAMINATION
mild ttp to plantar aspect of heel, mild posterior calf/achilles region ttp. negative Ferrara test.   no LE edema, normal equal distal pulses. no joint warmth/erythema. FROM all joints. normal valgus/varus stress, negative anterior/posterior drawer test. no bony ttp. strength 5/5. normal plantar/dorsiflexion.    Physical Exam:    CONSTITUTIONAL:  Generally well appearing, no acute distress, alert, awake and oriented  HEENT:  Moist mucous membranes, normal voice  PULM:  No accessory muscle use, speaking full sentences  SKIN:  Normal in appearance, normal color

## 2023-02-01 NOTE — ED PROVIDER NOTE - OBJECTIVE STATEMENT
52F PMH HLD p/w pain. Pt states that yesterday while playing basketball (she is a ) she jumped and when she landed on L heel she felt sudden pain/"pop" to area under (not posterior) L heel. Has slowly worsening pain to L heel region radiating up to posterior calf. No other systemic symptoms. Has not taken any pain meds today. No other injuries.   Denies weakness/numbness, knee pain, back pain, f/c, SOB/CP, NVD, other joint pains.

## 2023-02-01 NOTE — ED ADULT NURSE NOTE - OBJECTIVE STATEMENT
52y female c/o L heel pain after playing basketball yesterday. states she jumped up and felt heel pain upon landing. ambulating w/ steady gait. denies numbness/tingling. No acute distress noted at this time.

## 2023-02-01 NOTE — ED PROVIDER NOTE - NSFOLLOWUPCLINICS_GEN_ALL_ED_FT
[facility]  Podiatry  [address] [place] [address]  Phone: [phone]  Fax:

## 2023-02-01 NOTE — ED PROVIDER NOTE - CLINICAL SUMMARY MEDICAL DECISION MAKING FREE TEXT BOX
52F PMH HLD p/w pain. Pt states that yesterday while playing basketball (she is a ) she jumped and when she landed on L heel she felt sudden pain/"pop" to area under (not posterior) L heel. Has slowly worsening pain to L heel region radiating up to posterior calf. No other systemic symptoms. Has not taken any pain meds today. No other injuries.   Vitals wnl, exam as above.  ddx: Possible fx, clinically no achilles tear.   XR, symptom control, reassess.

## 2023-02-01 NOTE — ED PROVIDER NOTE - PROVIDER TOKENS
PROVIDER:[TOKEN:[[id number]]] PROVIDER:[TOKEN:[[id number]]],PROVIDER:[TOKEN:[[id number]]]

## 2023-03-18 NOTE — ED PROVIDER NOTE - MDM ORDERS SUBMITTED SELECTION
Subjective   Patient ID: Michael Maria is a 2 y.o. male who presents for Rash (On face and stomach x 2-3 days) and Nasal Congestion (Green nasal discharge today).  Rash x 3-4 days   No fever, spreading  Rhinorrhea today , thick and green     Rash        Review of Systems   Skin:  Positive for rash.       Objective   Visit Vitals  Temp 36.8 °C (98.3 °F) (Axillary)      Physical Exam  Constitutional:       General: He is active.   HENT:      Head: Normocephalic.      Right Ear: A middle ear effusion (pus) is present.      Left Ear: Tympanic membrane normal.      Nose: Rhinorrhea present.      Mouth/Throat:      Mouth: Mucous membranes are moist.   Eyes:      Conjunctiva/sclera: Conjunctivae normal.   Cardiovascular:      Rate and Rhythm: Normal rate and regular rhythm.   Musculoskeletal:      Cervical back: Normal range of motion and neck supple.   Skin:     Comments: Scattered papules on face,  tip of nose crusted, few papules on abdomen also    Neurological:      Mental Status: He is alert.         Assessment/Plan   Michael was seen today for rash and nasal congestion.  Diagnoses and all orders for this visit:  Non-recurrent acute suppurative otitis media of right ear without spontaneous rupture of tympanic membrane (Primary)  -     amoxicillin-pot clavulanate (Augmentin ES-600) 600-42.9 mg/5 mL suspension; Take 4 mL (480 mg) by mouth in the morning and 4 mL (480 mg) before bedtime. Do all this for 10 days.  Impetigo  -     amoxicillin-pot clavulanate (Augmentin ES-600) 600-42.9 mg/5 mL suspension; Take 4 mL (480 mg) by mouth in the morning and 4 mL (480 mg) before bedtime. Do all this for 10 days.  -     mupirocin (Bactroban) 2 % ointment; Apply topically 3 times a day for 10 days.      Imaging Studies/Medications

## 2023-06-02 ENCOUNTER — EMERGENCY (EMERGENCY)
Facility: HOSPITAL | Age: 53
LOS: 1 days | Discharge: HOME | End: 2023-06-02
Attending: EMERGENCY MEDICINE | Admitting: EMERGENCY MEDICINE
Payer: COMMERCIAL

## 2023-06-02 VITALS
DIASTOLIC BLOOD PRESSURE: 83 MMHG | HEART RATE: 87 BPM | WEIGHT: 194.01 LBS | OXYGEN SATURATION: 98 % | SYSTOLIC BLOOD PRESSURE: 148 MMHG | TEMPERATURE: 98 F | RESPIRATION RATE: 17 BRPM | HEIGHT: 63 IN

## 2023-06-02 DIAGNOSIS — E78.5 HYPERLIPIDEMIA, UNSPECIFIED: ICD-10-CM

## 2023-06-02 DIAGNOSIS — R22.43 LOCALIZED SWELLING, MASS AND LUMP, LOWER LIMB, BILATERAL: ICD-10-CM

## 2023-06-02 DIAGNOSIS — R60.0 LOCALIZED EDEMA: ICD-10-CM

## 2023-06-02 DIAGNOSIS — Z86.79 PERSONAL HISTORY OF OTHER DISEASES OF THE CIRCULATORY SYSTEM: ICD-10-CM

## 2023-06-02 DIAGNOSIS — Z91.018 ALLERGY TO OTHER FOODS: ICD-10-CM

## 2023-06-02 DIAGNOSIS — Z87.39 PERSONAL HISTORY OF OTHER DISEASES OF THE MUSCULOSKELETAL SYSTEM AND CONNECTIVE TISSUE: ICD-10-CM

## 2023-06-02 DIAGNOSIS — Z91.013 ALLERGY TO SEAFOOD: ICD-10-CM

## 2023-06-02 DIAGNOSIS — Z87.828 PERSONAL HISTORY OF OTHER (HEALED) PHYSICAL INJURY AND TRAUMA: ICD-10-CM

## 2023-06-02 LAB
ANION GAP SERPL CALC-SCNC: 8 MMOL/L — SIGNIFICANT CHANGE UP (ref 5–17)
BASOPHILS # BLD AUTO: 0.04 K/UL — SIGNIFICANT CHANGE UP (ref 0–0.2)
BASOPHILS NFR BLD AUTO: 0.7 % — SIGNIFICANT CHANGE UP (ref 0–2)
BUN SERPL-MCNC: 13 MG/DL — SIGNIFICANT CHANGE UP (ref 7–23)
CALCIUM SERPL-MCNC: 9.5 MG/DL — SIGNIFICANT CHANGE UP (ref 8.4–10.5)
CHLORIDE SERPL-SCNC: 102 MMOL/L — SIGNIFICANT CHANGE UP (ref 96–108)
CO2 SERPL-SCNC: 27 MMOL/L — SIGNIFICANT CHANGE UP (ref 22–31)
CREAT SERPL-MCNC: 0.78 MG/DL — SIGNIFICANT CHANGE UP (ref 0.5–1.3)
EGFR: 91 ML/MIN/1.73M2 — SIGNIFICANT CHANGE UP
EOSINOPHIL # BLD AUTO: 0.2 K/UL — SIGNIFICANT CHANGE UP (ref 0–0.5)
EOSINOPHIL NFR BLD AUTO: 3.3 % — SIGNIFICANT CHANGE UP (ref 0–6)
GLUCOSE SERPL-MCNC: 119 MG/DL — HIGH (ref 70–99)
HCT VFR BLD CALC: 39.4 % — SIGNIFICANT CHANGE UP (ref 34.5–45)
HGB BLD-MCNC: 12.6 G/DL — SIGNIFICANT CHANGE UP (ref 11.5–15.5)
IMM GRANULOCYTES NFR BLD AUTO: 0.2 % — SIGNIFICANT CHANGE UP (ref 0–0.9)
LYMPHOCYTES # BLD AUTO: 1.56 K/UL — SIGNIFICANT CHANGE UP (ref 1–3.3)
LYMPHOCYTES # BLD AUTO: 25.6 % — SIGNIFICANT CHANGE UP (ref 13–44)
MCHC RBC-ENTMCNC: 29.1 PG — SIGNIFICANT CHANGE UP (ref 27–34)
MCHC RBC-ENTMCNC: 32 GM/DL — SIGNIFICANT CHANGE UP (ref 32–36)
MCV RBC AUTO: 91 FL — SIGNIFICANT CHANGE UP (ref 80–100)
MONOCYTES # BLD AUTO: 0.32 K/UL — SIGNIFICANT CHANGE UP (ref 0–0.9)
MONOCYTES NFR BLD AUTO: 5.3 % — SIGNIFICANT CHANGE UP (ref 2–14)
NEUTROPHILS # BLD AUTO: 3.96 K/UL — SIGNIFICANT CHANGE UP (ref 1.8–7.4)
NEUTROPHILS NFR BLD AUTO: 64.9 % — SIGNIFICANT CHANGE UP (ref 43–77)
NRBC # BLD: 0 /100 WBCS — SIGNIFICANT CHANGE UP (ref 0–0)
PLATELET # BLD AUTO: 322 K/UL — SIGNIFICANT CHANGE UP (ref 150–400)
POTASSIUM SERPL-MCNC: 3.5 MMOL/L — SIGNIFICANT CHANGE UP (ref 3.5–5.3)
POTASSIUM SERPL-SCNC: 3.5 MMOL/L — SIGNIFICANT CHANGE UP (ref 3.5–5.3)
RBC # BLD: 4.33 M/UL — SIGNIFICANT CHANGE UP (ref 3.8–5.2)
RBC # FLD: 12.9 % — SIGNIFICANT CHANGE UP (ref 10.3–14.5)
SODIUM SERPL-SCNC: 137 MMOL/L — SIGNIFICANT CHANGE UP (ref 135–145)
WBC # BLD: 6.09 K/UL — SIGNIFICANT CHANGE UP (ref 3.8–10.5)
WBC # FLD AUTO: 6.09 K/UL — SIGNIFICANT CHANGE UP (ref 3.8–10.5)

## 2023-06-02 PROCEDURE — 85025 COMPLETE CBC W/AUTO DIFF WBC: CPT

## 2023-06-02 PROCEDURE — 93970 EXTREMITY STUDY: CPT | Mod: 26

## 2023-06-02 PROCEDURE — 99284 EMERGENCY DEPT VISIT MOD MDM: CPT

## 2023-06-02 PROCEDURE — 93970 EXTREMITY STUDY: CPT

## 2023-06-02 PROCEDURE — 99284 EMERGENCY DEPT VISIT MOD MDM: CPT | Mod: 25

## 2023-06-02 PROCEDURE — 36415 COLL VENOUS BLD VENIPUNCTURE: CPT

## 2023-06-02 PROCEDURE — 80048 BASIC METABOLIC PNL TOTAL CA: CPT

## 2023-06-02 NOTE — ED PROVIDER NOTE - PATIENT PORTAL LINK FT
You can access the FollowMyHealth Patient Portal offered by [facility] by registering at the following website: [email or web address]. By joining [email or web address]’s FollowMyHealth portal, you will also be able to view your health information using other applications (apps) compatible with our system.

## 2023-06-02 NOTE — ED PROVIDER NOTE - CARE PROVIDER_API CALL
Jason Aguillon  Orthopaedic Surgery  [address] [place] [address]  Phone: [phone]  Fax: [phone])-  Follow Up Time:     Vicente Madera  Vascular Surgery  [address]  [place] [address]  Phone: [phone]  Fax: [phone]  Follow Up Time:

## 2023-06-02 NOTE — ED ADULT NURSE NOTE - NSFALLUNIVINTERV_ED_ALL_ED
Bed/Stretcher in lowest position, wheels locked, appropriate side rails in place/Call bell, personal items and telephone in reach/Instruct patient to call for assistance before getting out of bed/chair/stretcher/Non-slip footwear applied when patient is off stretcher/[place] to call system/Physically safe environment - no spills, clutter or unnecessary equipment/Purposeful proactive rounding/Room/bathroom lighting operational, light cord in reach

## 2023-06-02 NOTE — ED PROVIDER NOTE - OBJECTIVE STATEMENT
Patient reports BLE swelling ongoing since January  She had a right ankle injury in January.  At that time she was given a boot, but she did not wear it  She has followed up with a podiatrist but hasn't been sent for further testing or treatment but was told it could be something with her achilles  In April she had varicose veins removed in both legs and has now noted swelling in both legs. No injury on the right  Today, she became worried that she could have a blood clot and came to the ER  No cp, no sob, no numbness or weakness     PMH: DIONE

## 2023-06-02 NOTE — ED PROVIDER NOTE - CLINICAL SUMMARY MEDICAL DECISION MAKING FREE TEXT BOX
Patient PMH HLD with BLE edema of an ongoing nature.  Will check for DVT and screen for renal dysfunction with lab.  Patient will be advised to f/u with ortho instead of Podiatry as her initial injury likely needs further treatment.

## 2023-06-02 NOTE — ED PROVIDER NOTE - NSFOLLOWUPINSTRUCTIONS_ED_ALL_ED_FT
KEEP YOUR LEGS ELEVATED WHEN POSSIBLE    YOU WERE EVALUATED TODAY FOR SWELLING IN YOUR LEGS.  YOUR BLOOD WORK WAS NORMAL AND DID NOT SHOW ANY SIGNS OF INFECTION OR PROBLEMS WITH YOUR KIDNEYS.  THERE WERE NO BLOOD CLOTS ON THE SONOGRAM.     DECREASE THE AMOUNT OF SALT IN YOUR DIET.    YOU NEED TO SEE AN ORTHOPEDIC SURGEON FOR THE CONTINUING PAIN IN YOUR ANKLE.  A DOCTOR'S NAME APPEARS BELOW, BUT YOU CAN ALSO SEE IF THE DOCTOR WHO REPAIRED YOUR KNEE CAN SEE YOU FOR THIS INJURY AS WELL.      IF YOUR LEGS CONTINUE TO SWELL, YOU CAN ALSO SEE A VASCULAR DOCTOR FOR ANOTHER OPINION.  THESE ARE SPECIALISTS IN THE BLOOD VESSELS OF THE LEGS. A NAME APPEARS ON THESE PAPERS.     RETURN TO THE EMERGENCY ROOM FOR:  FEVER  NUMBNESS OR WEAKNESS IN THE FEET OR LEGS  REDNESS TO THE SKIN   YOU CANNOT WALK  ANY NEW, WORSENING OR CONCERNING SYMPTOMS

## 2023-06-02 NOTE — ED ADULT TRIAGE NOTE - CHIEF COMPLAINT QUOTE
Pt co b/l LE swelling over the past few days associated with increasing difficulty with ambulation. Denies CP, SOB, HA, dizziness.

## 2023-06-02 NOTE — ED ADULT NURSE NOTE - OBJECTIVE STATEMENT
Pt presents to ER c/o, Pt A&O x3, calm and cooperative, airway patent, respirations regular, non-labored, lungs clear bilaterally to auscultation, abdomen soft non-tender, normoactive bowel sounds noted in all quadrants, strong palpable peripheral pulses noted, skin warm dry intact. Pt waiting ER provider evaluation. Pt presents to ER c/o BLE swelling causing difficulty with ambulation, Pt A&O x3, calm and cooperative, airway patent, respirations regular, non-labored, lungs clear bilaterally to auscultation, abdomen soft non-tender, normoactive bowel sounds noted in all quadrants, strong palpable peripheral pulses noted, skin warm dry intact. Pt waiting ER provider evaluation.

## 2023-06-02 NOTE — ED PROVIDER NOTE - PHYSICAL EXAMINATION
General:  Well appearing, no distress  HEENT:  No conjunctival injection, neck supple, no congestion   Chest:  Non-tender, no crepitance  Lungs:  Clear to auscultation bilaterally   Heart:  s1s2 normal, no murmur  Abdomen:  soft, non-tender, non-distended  :  Deferred  Rectal:  Deferred  Extremities:  1+ pitting edema BLE.  There is moderate tenderness to the left mid achilles but neg servin.  No erythema/lymphangitis.  Distal pulses intact   Neuro:  Alert, conversant, motor/sensory grossly intact

## 2023-10-23 ENCOUNTER — APPOINTMENT (OUTPATIENT)
Dept: ORTHOPEDIC SURGERY | Facility: CLINIC | Age: 53
End: 2023-10-23
Payer: COMMERCIAL

## 2023-10-23 VITALS — WEIGHT: 187 LBS | RESPIRATION RATE: 16 BRPM | BODY MASS INDEX: 33.13 KG/M2 | HEIGHT: 63 IN

## 2023-10-23 DIAGNOSIS — S83.411A SPRAIN OF MEDIAL COLLATERAL LIGAMENT OF RIGHT KNEE, INITIAL ENCOUNTER: ICD-10-CM

## 2023-10-23 DIAGNOSIS — M17.0 BILATERAL PRIMARY OSTEOARTHRITIS OF KNEE: ICD-10-CM

## 2023-10-23 PROCEDURE — 99213 OFFICE O/P EST LOW 20 MIN: CPT

## 2023-10-23 PROCEDURE — 73564 X-RAY EXAM KNEE 4 OR MORE: CPT | Mod: 50

## 2023-10-23 RX ORDER — CELECOXIB 100 MG/1
100 CAPSULE ORAL
Qty: 90 | Refills: 1 | Status: ACTIVE | COMMUNITY
Start: 2023-10-23 | End: 1900-01-01

## 2023-11-13 ENCOUNTER — OUTPATIENT (OUTPATIENT)
Dept: OUTPATIENT SERVICES | Facility: HOSPITAL | Age: 53
LOS: 1 days | End: 2023-11-13
Payer: COMMERCIAL

## 2023-11-13 DIAGNOSIS — R06.00 DYSPNEA, UNSPECIFIED: ICD-10-CM

## 2023-11-13 PROCEDURE — 94070 EVALUATION OF WHEEZING: CPT

## 2023-11-13 PROCEDURE — 94070 EVALUATION OF WHEEZING: CPT | Mod: 26

## 2024-07-17 ENCOUNTER — EMERGENCY (EMERGENCY)
Facility: HOSPITAL | Age: 54
LOS: 1 days | Discharge: HOME | End: 2024-07-17
Attending: EMERGENCY MEDICINE | Admitting: EMERGENCY MEDICINE
Payer: COMMERCIAL

## 2024-07-17 VITALS
HEIGHT: 63 IN | TEMPERATURE: 98 F | WEIGHT: 184.97 LBS | DIASTOLIC BLOOD PRESSURE: 76 MMHG | OXYGEN SATURATION: 98 % | SYSTOLIC BLOOD PRESSURE: 134 MMHG | HEART RATE: 65 BPM | RESPIRATION RATE: 17 BRPM

## 2024-07-17 LAB
ALBUMIN SERPL ELPH-MCNC: 4.3 G/DL — SIGNIFICANT CHANGE UP (ref 3.3–5)
ALP SERPL-CCNC: 105 U/L — SIGNIFICANT CHANGE UP (ref 40–120)
ALT FLD-CCNC: 14 U/L — SIGNIFICANT CHANGE UP (ref 10–45)
ANION GAP SERPL CALC-SCNC: 8 MMOL/L — SIGNIFICANT CHANGE UP (ref 5–17)
APPEARANCE UR: CLEAR — SIGNIFICANT CHANGE UP
AST SERPL-CCNC: 19 U/L — SIGNIFICANT CHANGE UP (ref 10–40)
BACTERIA # UR AUTO: ABNORMAL /HPF
BILIRUB SERPL-MCNC: 0.6 MG/DL — SIGNIFICANT CHANGE UP (ref 0.2–1.2)
BILIRUB UR-MCNC: NEGATIVE — SIGNIFICANT CHANGE UP
BUN SERPL-MCNC: 12 MG/DL — SIGNIFICANT CHANGE UP (ref 7–23)
CALCIUM SERPL-MCNC: 9.6 MG/DL — SIGNIFICANT CHANGE UP (ref 8.4–10.5)
CAST: 2 /LPF — SIGNIFICANT CHANGE UP (ref 0–4)
CHLORIDE SERPL-SCNC: 104 MMOL/L — SIGNIFICANT CHANGE UP (ref 96–108)
CO2 SERPL-SCNC: 27 MMOL/L — SIGNIFICANT CHANGE UP (ref 22–31)
COLOR SPEC: YELLOW — SIGNIFICANT CHANGE UP
CREAT SERPL-MCNC: 0.83 MG/DL — SIGNIFICANT CHANGE UP (ref 0.5–1.3)
DIFF PNL FLD: NEGATIVE — SIGNIFICANT CHANGE UP
EGFR: 84 ML/MIN/1.73M2 — SIGNIFICANT CHANGE UP
EGFR: 84 ML/MIN/1.73M2 — SIGNIFICANT CHANGE UP
GLUCOSE SERPL-MCNC: 91 MG/DL — SIGNIFICANT CHANGE UP (ref 70–99)
GLUCOSE UR QL: NEGATIVE MG/DL — SIGNIFICANT CHANGE UP
HCT VFR BLD CALC: 39.5 % — SIGNIFICANT CHANGE UP (ref 34.5–45)
HGB BLD-MCNC: 12.5 G/DL — SIGNIFICANT CHANGE UP (ref 11.5–15.5)
KETONES UR-MCNC: ABNORMAL MG/DL
LEUKOCYTE ESTERASE UR-ACNC: ABNORMAL
MCHC RBC-ENTMCNC: 28.9 PG — SIGNIFICANT CHANGE UP (ref 27–34)
MCHC RBC-ENTMCNC: 31.6 GM/DL — LOW (ref 32–36)
MCV RBC AUTO: 91.4 FL — SIGNIFICANT CHANGE UP (ref 80–100)
NITRITE UR-MCNC: NEGATIVE — SIGNIFICANT CHANGE UP
NRBC # BLD: 0 /100 WBCS — SIGNIFICANT CHANGE UP (ref 0–0)
NRBC BLD-RTO: 0 /100 WBCS — SIGNIFICANT CHANGE UP (ref 0–0)
PH UR: 5.5 — SIGNIFICANT CHANGE UP (ref 5–8)
PLATELET # BLD AUTO: 278 K/UL — SIGNIFICANT CHANGE UP (ref 150–400)
POTASSIUM SERPL-MCNC: 3.7 MMOL/L — SIGNIFICANT CHANGE UP (ref 3.5–5.3)
POTASSIUM SERPL-SCNC: 3.7 MMOL/L — SIGNIFICANT CHANGE UP (ref 3.5–5.3)
PROT SERPL-MCNC: 7.4 G/DL — SIGNIFICANT CHANGE UP (ref 6–8.3)
PROT UR-MCNC: NEGATIVE MG/DL — SIGNIFICANT CHANGE UP
RBC # BLD: 4.32 M/UL — SIGNIFICANT CHANGE UP (ref 3.8–5.2)
RBC # FLD: 12.7 % — SIGNIFICANT CHANGE UP (ref 10.3–14.5)
RBC CASTS # UR COMP ASSIST: 3 /HPF — SIGNIFICANT CHANGE UP (ref 0–4)
SODIUM SERPL-SCNC: 139 MMOL/L — SIGNIFICANT CHANGE UP (ref 135–145)
SP GR SPEC: 1.02 — SIGNIFICANT CHANGE UP (ref 1–1.03)
SQUAMOUS # UR AUTO: 6 /HPF — HIGH (ref 0–5)
UROBILINOGEN FLD QL: 0.2 MG/DL — SIGNIFICANT CHANGE UP (ref 0.2–1)
WBC # BLD: 4.98 K/UL — SIGNIFICANT CHANGE UP (ref 3.8–10.5)
WBC # FLD AUTO: 4.98 K/UL — SIGNIFICANT CHANGE UP (ref 3.8–10.5)
WBC UR QL: 35 /HPF — HIGH (ref 0–5)

## 2024-07-17 PROCEDURE — 99284 EMERGENCY DEPT VISIT MOD MDM: CPT

## 2024-07-17 RX ORDER — KETOROLAC TROMETHAMINE 30 MG/ML
15 INJECTION, SOLUTION INTRAMUSCULAR; INTRAVENOUS ONCE
Refills: 0 | Status: DISCONTINUED | OUTPATIENT
Start: 2024-07-17 | End: 2024-07-17

## 2024-07-17 RX ADMIN — Medication 1000 MILLILITER(S): at 23:29

## 2024-07-17 RX ADMIN — KETOROLAC TROMETHAMINE 15 MILLIGRAM(S): 30 INJECTION, SOLUTION INTRAMUSCULAR; INTRAVENOUS at 23:29

## 2024-07-18 VITALS
DIASTOLIC BLOOD PRESSURE: 78 MMHG | SYSTOLIC BLOOD PRESSURE: 122 MMHG | RESPIRATION RATE: 19 BRPM | OXYGEN SATURATION: 99 % | HEART RATE: 63 BPM

## 2024-07-18 PROCEDURE — 81001 URINALYSIS AUTO W/SCOPE: CPT

## 2024-07-18 PROCEDURE — 80053 COMPREHEN METABOLIC PANEL: CPT

## 2024-07-18 PROCEDURE — 36415 COLL VENOUS BLD VENIPUNCTURE: CPT

## 2024-07-18 PROCEDURE — 74176 CT ABD & PELVIS W/O CONTRAST: CPT | Mod: MC

## 2024-07-18 PROCEDURE — 96374 THER/PROPH/DIAG INJ IV PUSH: CPT

## 2024-07-18 PROCEDURE — 85027 COMPLETE CBC AUTOMATED: CPT

## 2024-07-18 PROCEDURE — 74176 CT ABD & PELVIS W/O CONTRAST: CPT | Mod: 26,MC

## 2024-07-18 PROCEDURE — 99284 EMERGENCY DEPT VISIT MOD MDM: CPT | Mod: 25

## 2024-07-18 PROCEDURE — 87086 URINE CULTURE/COLONY COUNT: CPT

## 2024-07-18 RX ORDER — CEFUROXIME SODIUM 1.5 G
250 VIAL (EA) INJECTION ONCE
Refills: 0 | Status: COMPLETED | OUTPATIENT
Start: 2024-07-18 | End: 2024-07-18

## 2024-07-18 RX ORDER — CEFUROXIME SODIUM 1.5 G
1 VIAL (EA) INJECTION
Qty: 14 | Refills: 0
Start: 2024-07-18 | End: 2024-07-24

## 2024-07-18 RX ADMIN — KETOROLAC TROMETHAMINE 15 MILLIGRAM(S): 30 INJECTION, SOLUTION INTRAMUSCULAR; INTRAVENOUS at 01:20

## 2024-07-18 RX ADMIN — Medication 250 MILLIGRAM(S): at 01:48

## 2024-07-19 DIAGNOSIS — Z91.013 ALLERGY TO SEAFOOD: ICD-10-CM

## 2024-07-19 DIAGNOSIS — Z91.018 ALLERGY TO OTHER FOODS: ICD-10-CM

## 2024-07-19 DIAGNOSIS — R10.31 RIGHT LOWER QUADRANT PAIN: ICD-10-CM

## 2024-07-19 DIAGNOSIS — R82.71 BACTERIURIA: ICD-10-CM

## 2024-07-19 LAB
CULTURE RESULTS: SIGNIFICANT CHANGE UP
SPECIMEN SOURCE: SIGNIFICANT CHANGE UP

## 2024-08-14 NOTE — ED PROCEDURE NOTE - NS ED PERI VASCULAR NEG
[Negative] : Heme/Lymph
fingers/toes warm to touch/no paresthesia/no swelling/no cyanosis of extremity

## 2025-02-04 ENCOUNTER — EMERGENCY (EMERGENCY)
Facility: HOSPITAL | Age: 55
LOS: 1 days | Discharge: HOME | End: 2025-02-04
Attending: STUDENT IN AN ORGANIZED HEALTH CARE EDUCATION/TRAINING PROGRAM | Admitting: STUDENT IN AN ORGANIZED HEALTH CARE EDUCATION/TRAINING PROGRAM
Payer: COMMERCIAL

## 2025-02-04 VITALS
WEIGHT: 184.97 LBS | RESPIRATION RATE: 18 BRPM | OXYGEN SATURATION: 97 % | HEART RATE: 90 BPM | TEMPERATURE: 98 F | DIASTOLIC BLOOD PRESSURE: 80 MMHG | SYSTOLIC BLOOD PRESSURE: 132 MMHG

## 2025-02-04 DIAGNOSIS — Z91.013 ALLERGY TO SEAFOOD: ICD-10-CM

## 2025-02-04 DIAGNOSIS — Z88.8 ALLERGY STATUS TO OTHER DRUGS, MEDICAMENTS AND BIOLOGICAL SUBSTANCES: ICD-10-CM

## 2025-02-04 DIAGNOSIS — M54.50 LOW BACK PAIN, UNSPECIFIED: ICD-10-CM

## 2025-02-04 LAB
APPEARANCE UR: CLEAR — SIGNIFICANT CHANGE UP
BILIRUB UR-MCNC: NEGATIVE — SIGNIFICANT CHANGE UP
COLOR SPEC: YELLOW — SIGNIFICANT CHANGE UP
DIFF PNL FLD: ABNORMAL
GLUCOSE UR QL: NEGATIVE MG/DL — SIGNIFICANT CHANGE UP
KETONES UR-MCNC: NEGATIVE MG/DL — SIGNIFICANT CHANGE UP
LEUKOCYTE ESTERASE UR-ACNC: ABNORMAL
NITRITE UR-MCNC: NEGATIVE — SIGNIFICANT CHANGE UP
PH UR: 6 — SIGNIFICANT CHANGE UP (ref 5–8)
PROT UR-MCNC: NEGATIVE MG/DL — SIGNIFICANT CHANGE UP
SP GR SPEC: 1.02 — SIGNIFICANT CHANGE UP (ref 1–1.03)
UROBILINOGEN FLD QL: 1 MG/DL — SIGNIFICANT CHANGE UP (ref 0.2–1)

## 2025-02-04 PROCEDURE — 72131 CT LUMBAR SPINE W/O DYE: CPT | Mod: MC

## 2025-02-04 PROCEDURE — 81001 URINALYSIS AUTO W/SCOPE: CPT

## 2025-02-04 PROCEDURE — 99284 EMERGENCY DEPT VISIT MOD MDM: CPT | Mod: 25

## 2025-02-04 PROCEDURE — 96372 THER/PROPH/DIAG INJ SC/IM: CPT

## 2025-02-04 PROCEDURE — 87077 CULTURE AEROBIC IDENTIFY: CPT

## 2025-02-04 PROCEDURE — 99284 EMERGENCY DEPT VISIT MOD MDM: CPT

## 2025-02-04 PROCEDURE — 87086 URINE CULTURE/COLONY COUNT: CPT

## 2025-02-04 PROCEDURE — 72131 CT LUMBAR SPINE W/O DYE: CPT | Mod: 26

## 2025-02-04 RX ORDER — CEFPODOXIME PROXETIL 200 MG
300 TABLET ORAL ONCE
Refills: 0 | Status: DISCONTINUED | OUTPATIENT
Start: 2025-02-04 | End: 2025-02-04

## 2025-02-04 RX ORDER — CEFPODOXIME PROXETIL 200 MG
1 TABLET ORAL
Qty: 14 | Refills: 0
Start: 2025-02-04 | End: 2025-02-10

## 2025-02-04 RX ORDER — KETOROLAC TROMETHAMINE 10 MG
15 TABLET ORAL ONCE
Refills: 0 | Status: DISCONTINUED | OUTPATIENT
Start: 2025-02-04 | End: 2025-02-04

## 2025-02-04 RX ORDER — CEFPODOXIME PROXETIL 200 MG
200 TABLET ORAL ONCE
Refills: 0 | Status: COMPLETED | OUTPATIENT
Start: 2025-02-04 | End: 2025-02-04

## 2025-02-04 RX ORDER — LIDOCAINE HYDROCHLORIDE 30 MG/G
1 CREAM TOPICAL ONCE
Refills: 0 | Status: COMPLETED | OUTPATIENT
Start: 2025-02-04 | End: 2025-02-04

## 2025-02-04 RX ADMIN — LIDOCAINE HYDROCHLORIDE 1 PATCH: 30 CREAM TOPICAL at 18:36

## 2025-02-04 RX ADMIN — Medication 15 MILLIGRAM(S): at 18:36

## 2025-02-04 RX ADMIN — Medication 15 MILLIGRAM(S): at 19:28

## 2025-02-04 RX ADMIN — Medication 200 MILLIGRAM(S): at 19:31

## 2025-02-04 NOTE — ED PROVIDER NOTE - PRO INTERPRETER NEED 2
Sent from audiology for impacted cerumen. Cerumen  Pre operative diagnosis: Cerumen impaction bilaterally  Post operative diagnosis: Same  Procedure: bilaterally cerumenectomy    After consent an operating microscope was utilized to visualize the external auditory canals bilaterally. Cerumen was removed with curettes and mckenna suctions on the both sides. The tympanic membrane is intact. No fluid visualized in the middle ear. No complications. I attest I performed the entire procedure myself.
English

## 2025-02-04 NOTE — ED PROVIDER NOTE - CARE PROVIDER_API CALL
Abilio Amaro Leonides  Spine Surgery  [address] [place] [address], [address]  [place] [address]  Phone: [phone]  Fax: [phone]  Follow Up Time: Routine

## 2025-02-04 NOTE — ED PROVIDER NOTE - NEUROLOGICAL, MLM
Alert and oriented, no focal deficits, no motor or sensory deficits. 5/5 strength BLE, Sensation equal intact symmetric BLE.

## 2025-02-04 NOTE — ED PROVIDER NOTE - PATIENT PORTAL LINK FT
You can access the FollowMyHealth Patient Portal offered by [facility] by registering at the following website: [email or web address]. By joining [employer]’s FollowMyHealth portal, you will also be able to view your health information using other applications (apps) compatible with our system.

## 2025-02-04 NOTE — ED ADULT TRIAGE NOTE - CHIEF COMPLAINT QUOTE
pt. c/o atraumatic lower back pain x 2 weeks. ambulatory in triage, denies n/t down the LE, continence changes, or hx of chronic back pain

## 2025-02-04 NOTE — ED PROVIDER NOTE - NSFOLLOWUPINSTRUCTIONS_ED_ALL_ED_FT
Follow up with a spine specialist for further evaluation. You have spinal stenosis. Follow up with a spine specialist for further evaluation.    You also  have a urine infection. I've prescribed you antibiotics. Pain in back can be from a urine infection as well.

## 2025-02-04 NOTE — ED ADULT NURSE NOTE - OBJECTIVE STATEMENT
Patient awake, alert, orientedx4, ambulatory with steady gait, endorsing lower back pain on going for two weeks, patient denies urinary incontinence or stool incontinence, pt denies recent fall or trauma. Patient denies numbness or tingling.

## 2025-02-04 NOTE — ED PROVIDER NOTE - CLINICAL SUMMARY MEDICAL DECISION MAKING FREE TEXT BOX
atraumatic right lower back pain, likely muscular vs disk herniation. Do not suspect cord pathology, no red flag sxs and neuro intact.  Plan for analgesia, CT L spine. atraumatic right lower back pain, likely muscular vs disk herniation vs uti/pyelo though no urinary symptoms. Do not suspect cord pathology, no red flag sxs and neuro intact.  Plan for analgesia, CT L spine.

## 2025-02-04 NOTE — ED PROVIDER NOTE - OBJECTIVE STATEMENT
54F here for atraumatic right lower back pain x 2 weeks, worse with movement and positional changes. Denies weakness, numbness, incontinence/retention, saddle anesthesia, fevers, chills, injuries.

## 2025-02-05 PROBLEM — E78.00 PURE HYPERCHOLESTEROLEMIA, UNSPECIFIED: Chronic | Status: ACTIVE | Noted: 2024-07-17

## 2025-02-05 PROBLEM — J45.909 UNSPECIFIED ASTHMA, UNCOMPLICATED: Chronic | Status: ACTIVE | Noted: 2024-07-17

## 2025-03-22 ENCOUNTER — OUTPATIENT (OUTPATIENT)
Dept: OUTPATIENT SERVICES | Facility: HOSPITAL | Age: 55
LOS: 1 days | End: 2025-03-22
Payer: COMMERCIAL

## 2025-03-22 ENCOUNTER — APPOINTMENT (OUTPATIENT)
Dept: SLEEP CENTER | Facility: HOSPITAL | Age: 55
End: 2025-03-22

## 2025-03-22 PROCEDURE — 95810 POLYSOM 6/> YRS 4/> PARAM: CPT

## 2025-03-22 PROCEDURE — 95810 POLYSOM 6/> YRS 4/> PARAM: CPT | Mod: 26

## 2025-03-24 ENCOUNTER — TRANSCRIPTION ENCOUNTER (OUTPATIENT)
Age: 55
End: 2025-03-24

## 2025-06-05 ENCOUNTER — EMERGENCY (EMERGENCY)
Facility: HOSPITAL | Age: 55
LOS: 1 days | End: 2025-06-05
Attending: STUDENT IN AN ORGANIZED HEALTH CARE EDUCATION/TRAINING PROGRAM | Admitting: STUDENT IN AN ORGANIZED HEALTH CARE EDUCATION/TRAINING PROGRAM
Payer: COMMERCIAL

## 2025-06-05 VITALS
HEART RATE: 105 BPM | SYSTOLIC BLOOD PRESSURE: 149 MMHG | DIASTOLIC BLOOD PRESSURE: 92 MMHG | OXYGEN SATURATION: 100 % | WEIGHT: 184.97 LBS | HEIGHT: 63 IN | TEMPERATURE: 98 F | RESPIRATION RATE: 16 BRPM

## 2025-06-05 VITALS
DIASTOLIC BLOOD PRESSURE: 76 MMHG | OXYGEN SATURATION: 98 % | SYSTOLIC BLOOD PRESSURE: 126 MMHG | RESPIRATION RATE: 18 BRPM | TEMPERATURE: 98 F | HEART RATE: 86 BPM

## 2025-06-05 DIAGNOSIS — R42 DIZZINESS AND GIDDINESS: ICD-10-CM

## 2025-06-05 DIAGNOSIS — R51.9 HEADACHE, UNSPECIFIED: ICD-10-CM

## 2025-06-05 DIAGNOSIS — Z91.018 ALLERGY TO OTHER FOODS: ICD-10-CM

## 2025-06-05 DIAGNOSIS — K21.9 GASTRO-ESOPHAGEAL REFLUX DISEASE WITHOUT ESOPHAGITIS: ICD-10-CM

## 2025-06-05 DIAGNOSIS — R53.83 OTHER FATIGUE: ICD-10-CM

## 2025-06-05 DIAGNOSIS — Z86.69 PERSONAL HISTORY OF OTHER DISEASES OF THE NERVOUS SYSTEM AND SENSE ORGANS: ICD-10-CM

## 2025-06-05 DIAGNOSIS — L29.9 PRURITUS, UNSPECIFIED: ICD-10-CM

## 2025-06-05 DIAGNOSIS — H53.149 VISUAL DISCOMFORT, UNSPECIFIED: ICD-10-CM

## 2025-06-05 DIAGNOSIS — J45.909 UNSPECIFIED ASTHMA, UNCOMPLICATED: ICD-10-CM

## 2025-06-05 DIAGNOSIS — R20.2 PARESTHESIA OF SKIN: ICD-10-CM

## 2025-06-05 DIAGNOSIS — F43.9 REACTION TO SEVERE STRESS, UNSPECIFIED: ICD-10-CM

## 2025-06-05 LAB
ANION GAP SERPL CALC-SCNC: 9 MMOL/L — SIGNIFICANT CHANGE UP (ref 5–17)
BASOPHILS # BLD AUTO: 0.03 K/UL — SIGNIFICANT CHANGE UP (ref 0–0.2)
BASOPHILS NFR BLD AUTO: 0.6 % — SIGNIFICANT CHANGE UP (ref 0–2)
BUN SERPL-MCNC: 11 MG/DL — SIGNIFICANT CHANGE UP (ref 7–23)
CALCIUM SERPL-MCNC: 9.4 MG/DL — SIGNIFICANT CHANGE UP (ref 8.4–10.5)
CHLORIDE SERPL-SCNC: 104 MMOL/L — SIGNIFICANT CHANGE UP (ref 96–108)
CO2 SERPL-SCNC: 27 MMOL/L — SIGNIFICANT CHANGE UP (ref 22–31)
CREAT SERPL-MCNC: 0.81 MG/DL — SIGNIFICANT CHANGE UP (ref 0.5–1.3)
EGFR: 86 ML/MIN/1.73M2 — SIGNIFICANT CHANGE UP
EGFR: 86 ML/MIN/1.73M2 — SIGNIFICANT CHANGE UP
EOSINOPHIL # BLD AUTO: 0.18 K/UL — SIGNIFICANT CHANGE UP (ref 0–0.5)
EOSINOPHIL NFR BLD AUTO: 3.5 % — SIGNIFICANT CHANGE UP (ref 0–6)
GLUCOSE SERPL-MCNC: 101 MG/DL — HIGH (ref 70–99)
HCT VFR BLD CALC: 39 % — SIGNIFICANT CHANGE UP (ref 34.5–45)
HGB BLD-MCNC: 12.6 G/DL — SIGNIFICANT CHANGE UP (ref 11.5–15.5)
IMM GRANULOCYTES NFR BLD AUTO: 0.4 % — SIGNIFICANT CHANGE UP (ref 0–0.9)
LYMPHOCYTES # BLD AUTO: 1.52 K/UL — SIGNIFICANT CHANGE UP (ref 1–3.3)
LYMPHOCYTES # BLD AUTO: 29.4 % — SIGNIFICANT CHANGE UP (ref 13–44)
MCHC RBC-ENTMCNC: 28.8 PG — SIGNIFICANT CHANGE UP (ref 27–34)
MCHC RBC-ENTMCNC: 32.3 G/DL — SIGNIFICANT CHANGE UP (ref 32–36)
MCV RBC AUTO: 89 FL — SIGNIFICANT CHANGE UP (ref 80–100)
MONOCYTES # BLD AUTO: 0.45 K/UL — SIGNIFICANT CHANGE UP (ref 0–0.9)
MONOCYTES NFR BLD AUTO: 8.7 % — SIGNIFICANT CHANGE UP (ref 2–14)
NEUTROPHILS # BLD AUTO: 2.97 K/UL — SIGNIFICANT CHANGE UP (ref 1.8–7.4)
NEUTROPHILS NFR BLD AUTO: 57.4 % — SIGNIFICANT CHANGE UP (ref 43–77)
NRBC BLD AUTO-RTO: 0 /100 WBCS — SIGNIFICANT CHANGE UP (ref 0–0)
PLATELET # BLD AUTO: 255 K/UL — SIGNIFICANT CHANGE UP (ref 150–400)
POTASSIUM SERPL-MCNC: 3.4 MMOL/L — LOW (ref 3.5–5.3)
POTASSIUM SERPL-SCNC: 3.4 MMOL/L — LOW (ref 3.5–5.3)
RBC # BLD: 4.38 M/UL — SIGNIFICANT CHANGE UP (ref 3.8–5.2)
RBC # FLD: 13 % — SIGNIFICANT CHANGE UP (ref 10.3–14.5)
SODIUM SERPL-SCNC: 140 MMOL/L — SIGNIFICANT CHANGE UP (ref 135–145)
WBC # BLD: 5.17 K/UL — SIGNIFICANT CHANGE UP (ref 3.8–10.5)
WBC # FLD AUTO: 5.17 K/UL — SIGNIFICANT CHANGE UP (ref 3.8–10.5)

## 2025-06-05 PROCEDURE — 96374 THER/PROPH/DIAG INJ IV PUSH: CPT

## 2025-06-05 PROCEDURE — 96375 TX/PRO/DX INJ NEW DRUG ADDON: CPT

## 2025-06-05 PROCEDURE — 99284 EMERGENCY DEPT VISIT MOD MDM: CPT

## 2025-06-05 PROCEDURE — 85025 COMPLETE CBC W/AUTO DIFF WBC: CPT

## 2025-06-05 PROCEDURE — 99284 EMERGENCY DEPT VISIT MOD MDM: CPT | Mod: 25

## 2025-06-05 PROCEDURE — 80048 BASIC METABOLIC PNL TOTAL CA: CPT

## 2025-06-05 PROCEDURE — 36415 COLL VENOUS BLD VENIPUNCTURE: CPT

## 2025-06-05 RX ORDER — DEXAMETHASONE 0.5 MG/1
6 TABLET ORAL ONCE
Refills: 0 | Status: COMPLETED | OUTPATIENT
Start: 2025-06-05 | End: 2025-06-05

## 2025-06-05 RX ORDER — SODIUM CHLORIDE 9 G/1000ML
1000 INJECTION, SOLUTION INTRAVENOUS ONCE
Refills: 0 | Status: COMPLETED | OUTPATIENT
Start: 2025-06-05 | End: 2025-06-05

## 2025-06-05 RX ORDER — DIPHENHYDRAMINE HCL 12.5MG/5ML
25 ELIXIR ORAL ONCE
Refills: 0 | Status: COMPLETED | OUTPATIENT
Start: 2025-06-05 | End: 2025-06-05

## 2025-06-05 RX ORDER — ACETAMINOPHEN 500 MG/5ML
1000 LIQUID (ML) ORAL ONCE
Refills: 0 | Status: COMPLETED | OUTPATIENT
Start: 2025-06-05 | End: 2025-06-05

## 2025-06-05 RX ORDER — KETOROLAC TROMETHAMINE 30 MG/ML
15 INJECTION, SOLUTION INTRAMUSCULAR; INTRAVENOUS ONCE
Refills: 0 | Status: DISCONTINUED | OUTPATIENT
Start: 2025-06-05 | End: 2025-06-05

## 2025-06-05 RX ORDER — METOCLOPRAMIDE HCL 10 MG
10 TABLET ORAL ONCE
Refills: 0 | Status: COMPLETED | OUTPATIENT
Start: 2025-06-05 | End: 2025-06-05

## 2025-06-05 RX ADMIN — Medication 400 MILLIGRAM(S): at 17:04

## 2025-06-05 RX ADMIN — DEXAMETHASONE 6 MILLIGRAM(S): 0.5 TABLET ORAL at 18:52

## 2025-06-05 RX ADMIN — KETOROLAC TROMETHAMINE 15 MILLIGRAM(S): 30 INJECTION, SOLUTION INTRAMUSCULAR; INTRAVENOUS at 18:09

## 2025-06-05 RX ADMIN — Medication 10 MILLIGRAM(S): at 17:06

## 2025-06-05 RX ADMIN — Medication 25 MILLIGRAM(S): at 17:04

## 2025-06-05 RX ADMIN — SODIUM CHLORIDE 1000 MILLILITER(S): 9 INJECTION, SOLUTION INTRAVENOUS at 17:06

## 2025-06-05 NOTE — ED PROVIDER NOTE - OBJECTIVE STATEMENT
54yF w/ asthma, GERD comes in for headache x1 week. States a dull pressure around her head which has been persistent x 1 week associated w/ photophobia, fatigue, lightheadedness, intermittent fingers tingling and itchy. No weakness/numbness. Reports headache temporarily improves when she lies down and rests or sleeps but returns when she wakes up. Did not take any analgesia today. States had migraines many years ago but not in a while. States she just graduated from school and has been working full time, has been under stress and getting poor sleep. Also feels dehydrated.

## 2025-06-05 NOTE — ED PROVIDER NOTE - NSFOLLOWUPINSTRUCTIONS_ED_ALL_ED_FT
Headache    A headache is pain or discomfort felt around the head or neck area. The specific cause of a headache may not be found as there are many types including tension headaches, migraine headaches, and cluster headaches. Watch your condition for any changes. Things you can do to manage your pain include taking over the counter and prescription medications as instructed by your health care provider, lying down in a dark quiet room, limiting stress, getting regular sleep, and refraining from alcohol and tobacco products.    SEEK IMMEDIATE MEDICAL CARE IF YOU HAVE ANY OF THE FOLLOWING SYMPTOMS: fever, vomiting, stiff neck, loss of vision, problems with speech, muscle weakness, loss of balance, trouble walking, passing out, or confusion. Follow up with your primary doctor, if your headaches become frequent then you should follow up with a neurologist. Take tylenol or ibuprofen as needed, make sure to stay hydrated and get enough sleep.    Headache    A headache is pain or discomfort felt around the head or neck area. The specific cause of a headache may not be found as there are many types including tension headaches, migraine headaches, and cluster headaches. Watch your condition for any changes. Things you can do to manage your pain include taking over the counter and prescription medications as instructed by your health care provider, lying down in a dark quiet room, limiting stress, getting regular sleep, and refraining from alcohol and tobacco products.    SEEK IMMEDIATE MEDICAL CARE IF YOU HAVE ANY OF THE FOLLOWING SYMPTOMS: fever, vomiting, stiff neck, loss of vision, problems with speech, muscle weakness, loss of balance, trouble walking, passing out, or confusion.

## 2025-06-05 NOTE — ED PROVIDER NOTE - CLINICAL SUMMARY MEDICAL DECISION MAKING FREE TEXT BOX
Assisted to dress, then discharged to home with sister, Susannah Brown. Has instructions, ice pack, cell phone, walker, boot and shoe and jacket. Triage VS w/   normal neuro exam as above  no red flags concerning for dangerous cause of headahce such as ICH, meningoencephalities, space occupying lesion  suspect likely primary headache possibly tension headache vs migraine  plan for labs, symptomatic treatment, reassess Triage VS w/   normal neuro exam as above  no red flags concerning for dangerous cause of headahce such as ICH, meningoencephalities, space occupying lesion  suspect likely primary headache possibly tension headache vs migraine  plan for labs, symptomatic treatment, reassess    -->labs OK, pt reports feeling tired after the benadryl but improvement in headache s/p meds, will dc w/ neuro outpt referral

## 2025-06-05 NOTE — ED ADULT NURSE NOTE - OBJECTIVE STATEMENT
55 yo female c/o headache x1 week with associated w/ photophobia, fatigue, lightheadedness, intermittent fingers tingling and itchy. NAD, ambulatory with steady gait.

## 2025-06-05 NOTE — ED PROVIDER NOTE - PATIENT PORTAL LINK FT
You can access the FollowMyHealth Patient Portal offered by [facility] by registering at the following website: [email or web address]. By joining [employer]’s FollowMyHealth portal, you will also be able to view your health information using other applications (apps) compatible with our system.

## 2025-06-05 NOTE — ED ADULT NURSE NOTE - NSFALLUNIVINTERV_ED_ALL_ED
Bed/Stretcher in lowest position, wheels locked, appropriate side rails in place/Call bell, personal items and telephone in reach/Instruct patient to call for assistance before getting out of bed/chair/stretcher/Non-slip footwear applied when patient is off stretcher/[place] to call system/Physically safe environment - no spills, clutter or unnecessary equipment/Purposeful proactive rounding/Room/bathroom lighting operational, light cord in reach

## 2025-06-05 NOTE — ED PROVIDER NOTE - NSFOLLOWUPCLINICS_GEN_ALL_ED_FT
[facility]  Neurology  [address] [place] [address]  Phone: [phone]  Fax: [phone]

## 2025-06-05 NOTE — ED PROVIDER NOTE - PHYSICAL EXAMINATION
CONST: nontoxic NAD speaking in full sentences  HEAD: atraumatic  EYES: conjunctivae clear, EOMI, PERRL  NECK: supple  CARD: regular rate  CHEST: breathing comfortably, no stridor/retractions/tripoding  EXT: FROM  SKIN: warm, dry  NEURO: a+ox3, no facial asymmetry, no aphasia, PERRL, EOMI, no neglect, CN II-XII intact, ftn wnl, neg pronator, 5/5 strength x4, gross sensation intact x4, normal gait